# Patient Record
Sex: MALE | Race: WHITE | Employment: FULL TIME | URBAN - METROPOLITAN AREA
[De-identification: names, ages, dates, MRNs, and addresses within clinical notes are randomized per-mention and may not be internally consistent; named-entity substitution may affect disease eponyms.]

---

## 2017-01-04 ENCOUNTER — OP HISTORICAL/CONVERTED ENCOUNTER (OUTPATIENT)
Dept: OTHER | Age: 52
End: 2017-01-04

## 2017-03-13 ENCOUNTER — OP HISTORICAL/CONVERTED ENCOUNTER (OUTPATIENT)
Dept: OTHER | Age: 52
End: 2017-03-13

## 2017-03-28 ENCOUNTER — IP HISTORICAL/CONVERTED ENCOUNTER (OUTPATIENT)
Dept: OTHER | Age: 52
End: 2017-03-28

## 2019-05-15 ENCOUNTER — OP HISTORICAL/CONVERTED ENCOUNTER (OUTPATIENT)
Dept: OTHER | Age: 54
End: 2019-05-15

## 2019-07-10 ENCOUNTER — HOSPITAL ENCOUNTER (OUTPATIENT)
Dept: GENERAL RADIOLOGY | Age: 54
Discharge: HOME | End: 2019-07-10
Payer: OTHER GOVERNMENT

## 2019-07-10 DIAGNOSIS — M25.569 KNEE PAIN: ICD-10-CM

## 2019-07-10 PROCEDURE — 73565 X-RAY EXAM OF KNEES: CPT

## 2019-10-21 ENCOUNTER — OP HISTORICAL/CONVERTED ENCOUNTER (OUTPATIENT)
Dept: OTHER | Age: 54
End: 2019-10-21

## 2019-10-24 ENCOUNTER — IP HISTORICAL/CONVERTED ENCOUNTER (OUTPATIENT)
Dept: OTHER | Age: 54
End: 2019-10-24

## 2020-01-16 ENCOUNTER — OP HISTORICAL/CONVERTED ENCOUNTER (OUTPATIENT)
Dept: OTHER | Age: 55
End: 2020-01-16

## 2020-06-16 ENCOUNTER — OP HISTORICAL/CONVERTED ENCOUNTER (OUTPATIENT)
Dept: OTHER | Age: 55
End: 2020-06-16

## 2020-08-06 ENCOUNTER — OP HISTORICAL/CONVERTED ENCOUNTER (OUTPATIENT)
Dept: OTHER | Age: 55
End: 2020-08-06

## 2020-08-10 ENCOUNTER — IP HISTORICAL/CONVERTED ENCOUNTER (OUTPATIENT)
Dept: OTHER | Age: 55
End: 2020-08-10

## 2020-08-18 LAB — CREATININE, EXTERNAL: 0.92

## 2020-09-08 ENCOUNTER — OFFICE VISIT (OUTPATIENT)
Dept: INFECTIOUS DISEASES | Age: 55
End: 2020-09-08
Payer: OTHER GOVERNMENT

## 2020-09-08 VITALS
DIASTOLIC BLOOD PRESSURE: 73 MMHG | HEART RATE: 87 BPM | SYSTOLIC BLOOD PRESSURE: 105 MMHG | WEIGHT: 197 LBS | HEIGHT: 70 IN | TEMPERATURE: 98.2 F | BODY MASS INDEX: 28.2 KG/M2 | OXYGEN SATURATION: 97 %

## 2020-09-08 DIAGNOSIS — Z22.322 MRSA COLONIZATION: ICD-10-CM

## 2020-09-08 DIAGNOSIS — M17.0 OSTEOARTHRITIS OF BOTH KNEES, UNSPECIFIED OSTEOARTHRITIS TYPE: ICD-10-CM

## 2020-09-08 DIAGNOSIS — T84.54XD INFECTION ASSOCIATED WITH INTERNAL LEFT KNEE PROSTHESIS, SUBSEQUENT ENCOUNTER: Primary | ICD-10-CM

## 2020-09-08 PROCEDURE — 99213 OFFICE O/P EST LOW 20 MIN: CPT | Performed by: INTERNAL MEDICINE

## 2020-09-08 RX ORDER — OXYCODONE HYDROCHLORIDE 5 MG/1
5 TABLET ORAL
Status: ON HOLD | COMMUNITY
Start: 2020-08-11 | End: 2020-11-30

## 2020-09-08 RX ORDER — NAPROXEN 500 MG/1
500 TABLET ORAL
COMMUNITY
Start: 2020-07-20 | End: 2020-12-01

## 2020-09-08 RX ORDER — ASPIRIN 325 MG
TABLET ORAL
Status: ON HOLD | COMMUNITY
Start: 2020-08-11 | End: 2020-11-30

## 2020-09-08 RX ORDER — RIZATRIPTAN BENZOATE 10 MG/1
10 TABLET, ORALLY DISINTEGRATING ORAL
COMMUNITY

## 2020-09-08 NOTE — PROGRESS NOTES
Subjective  [place]        HPI  HPI  Pleasant 75-year-old  male familiar to me from a recent admission to [facility] with left prosthetic knee infection. He is s/p left knee debridement w prosthesis explantation on 8/10 by Dr. Sarah Candelario. Multiple intraoperative Cxs were negative however staph epidermidis was isolated from left knee aspirate Cx done as outpt by orthopedics. He also has a h/o MRSA colonization, s/p colonization with negative repeat screen. Pt was treated with ceftriaxone and vancomycin while hospitalized and discharged on a 6-8  wk course of vancomycin. His ESR was 10 and CRP 43 on 8/11. Labs on 8/25 showed a CRP of 36 and ESR 21. He admits to compliance with vancomycin therapy, denies missed doses or adverse side effects. He mentions his vancomycin dosing has been adjusted recently by the pharmacist due to supratherapeutic trough of > 20. Home health has not been able to draw blood from his left armpit and Cathflo has been ordered to be administered on 09/9. He denies acute complaints on assessment today. ROS  Review of Systems   Constitutional: Negative. Respiratory: Negative. Cardiovascular: Negative. Gastrointestinal: Negative. Genitourinary: Negative. Musculoskeletal: Negative. Skin: Negative. Objective  Physical Exam  Constitutional:       Appearance: Normal appearance. HENT:      Head: Normocephalic and atraumatic. Cardiovascular:      Rate and Rhythm: Normal rate and regular rhythm. Pulmonary:      Effort: Pulmonary effort is normal.      Breath sounds: Normal breath sounds. Abdominal:      General: Abdomen is flat. Palpations: Abdomen is soft. Musculoskeletal: Normal range of motion. Comments: Left knee swelling w brace in place    Skin:     General: Skin is warm. Neurological:      Mental Status: He is alert. Assessment & Plan  Diagnoses and all orders for this visit:    1.  Infection associated with internal left knee prosthesis, subsequent encounter      -Left prosthetic knee infection, s/p debridement with explantation of prosthesis on 8/10      -Intra-op Cxs were neg but staph epi was isolated from left knee aspirate Cx done by ortho as out pt       -On a 6-8 wk course of vancomycin for staph epidermidis/MRSA coverage      -Admits to compliance w antimicrobial therapy, inflammatory markers are trending down       -Will follow recent labs done a wk ago       -Recommend left knee aspirate after 6 wks of IV antibiotics if inflammatory markers normalized if not, will continue Vanc for 2  More wks to complete 8 wks of therapy    2. Osteoarthritis of both knees, unspecified osteoarthritis type      - S/p B/l knee replacement, right knee doing ok, left w infection as above     3.  H/o MRSA colonization: S/p decolonization w neg screen

## 2020-09-14 ENCOUNTER — HOSPITAL ENCOUNTER (OUTPATIENT)
Dept: LAB | Age: 55
Discharge: HOME | End: 2020-09-14
Payer: OTHER GOVERNMENT

## 2020-09-14 LAB
BASOPHILS # BLD: 0.1 K/UL (ref 0–0.1)
BASOPHILS NFR BLD: 1 % (ref 0–1)
BUN SERPL-MCNC: 13 MG/DL (ref 6–20)
CRP SERPL-MCNC: 2.7 MG/DL (ref 0–0.6)
DATE LAST DOSE: ABNORMAL
DIFFERENTIAL METHOD BLD: ABNORMAL
EOSINOPHIL # BLD: 0.2 K/UL (ref 0–0.4)
EOSINOPHIL NFR BLD: 4 % (ref 0–7)
ERYTHROCYTE [DISTWIDTH] IN BLOOD BY AUTOMATED COUNT: 14.6 % (ref 11.5–14.5)
HCT VFR BLD AUTO: 36.1 % (ref 36.6–50.3)
HGB BLD-MCNC: 11 % (ref 12.1–17)
IMM GRANULOCYTES # BLD AUTO: 0 K/UL (ref 0–0.04)
IMM GRANULOCYTES NFR BLD AUTO: 1 % (ref 0–0.5)
LYMPHOCYTES # BLD: 1.4 K/UL (ref 0.8–3.5)
LYMPHOCYTES NFR BLD: 22 % (ref 12–49)
MCH RBC QN AUTO: 25.8 PG (ref 26–34)
MCHC RBC AUTO-ENTMCNC: 30.5 G/DL (ref 30–36.5)
MCV RBC AUTO: 84.5 FL (ref 80–99)
MONOCYTES # BLD: 0.5 K/UL (ref 0–1)
MONOCYTES NFR BLD: 8 % (ref 5–13)
NEUTS SEG # BLD: 4.3 K/UL (ref 1.8–8)
NEUTS SEG NFR BLD: 64 % (ref 32–75)
PLATELET # BLD AUTO: 185 K/UL (ref 150–400)
PMV BLD AUTO: 10.9 FL (ref 8.9–12.9)
RBC # BLD AUTO: 4.27 M/UL (ref 4.1–5.7)
REPORTED DOSE,DOSE: ABNORMAL UNITS
VANCOMYCIN TROUGH SERPL-MCNC: 13.8 UG/ML (ref 5–10)
WBC # BLD AUTO: 6.5 K/UL (ref 4.1–11.1)

## 2020-09-14 PROCEDURE — 85025 COMPLETE CBC W/AUTO DIFF WBC: CPT

## 2020-09-14 PROCEDURE — 80202 ASSAY OF VANCOMYCIN: CPT

## 2020-09-14 PROCEDURE — 84520 ASSAY OF UREA NITROGEN: CPT

## 2020-09-14 PROCEDURE — 86140 C-REACTIVE PROTEIN: CPT

## 2020-09-17 ENCOUNTER — HOSPITAL ENCOUNTER (OUTPATIENT)
Dept: LAB | Age: 55
Discharge: HOME | End: 2020-09-17
Payer: OTHER GOVERNMENT

## 2020-09-17 DIAGNOSIS — T84.54XA INFECTION OF TOTAL LEFT KNEE REPLACEMENT, INITIAL ENCOUNTER (HCC): ICD-10-CM

## 2020-09-17 LAB
BASOPHILS # BLD: 0.1 K/UL (ref 0–0.1)
BASOPHILS NFR BLD: 1 % (ref 0–1)
BUN SERPL-MCNC: 15 MG/DL (ref 6–20)
CREAT SERPL-MCNC: 1.08 MG/DL (ref 0.7–1.3)
CRP SERPL-MCNC: 1.43 MG/DL (ref 0–0.6)
DATE LAST DOSE: ABNORMAL
DIFFERENTIAL METHOD BLD: ABNORMAL
EOSINOPHIL # BLD: 0.2 K/UL (ref 0–0.4)
EOSINOPHIL NFR BLD: 3 % (ref 0–7)
ERYTHROCYTE [DISTWIDTH] IN BLOOD BY AUTOMATED COUNT: 14.3 % (ref 11.5–14.5)
ERYTHROCYTE [SEDIMENTATION RATE] IN BLOOD: 7 MM/HR
HCT VFR BLD AUTO: 38.2 % (ref 36.6–50.3)
HGB BLD-MCNC: 11.5 % (ref 12.1–17)
IMM GRANULOCYTES # BLD AUTO: 0 K/UL (ref 0–0.04)
IMM GRANULOCYTES NFR BLD AUTO: 0 % (ref 0–0.5)
LYMPHOCYTES # BLD: 1.6 K/UL (ref 0.8–3.5)
LYMPHOCYTES NFR BLD: 29 % (ref 12–49)
MCH RBC QN AUTO: 25.2 PG (ref 26–34)
MCHC RBC AUTO-ENTMCNC: 30.1 G/DL (ref 30–36.5)
MCV RBC AUTO: 83.8 FL (ref 80–99)
MONOCYTES # BLD: 0.4 K/UL (ref 0–1)
MONOCYTES NFR BLD: 8 % (ref 5–13)
NEUTS SEG # BLD: 3.1 K/UL (ref 1.8–8)
NEUTS SEG NFR BLD: 59 % (ref 32–75)
PLATELET # BLD AUTO: 188 K/UL (ref 150–400)
PMV BLD AUTO: 11 FL (ref 8.9–12.9)
RBC # BLD AUTO: 4.56 M/UL (ref 4.1–5.7)
REPORTED DOSE,DOSE: ABNORMAL UNITS
VANCOMYCIN TROUGH SERPL-MCNC: 19.1 UG/ML (ref 5–10)
WBC # BLD AUTO: 5.3 K/UL (ref 4.1–11.1)

## 2020-09-17 PROCEDURE — 85652 RBC SED RATE AUTOMATED: CPT

## 2020-09-17 PROCEDURE — 84520 ASSAY OF UREA NITROGEN: CPT

## 2020-09-17 PROCEDURE — 80202 ASSAY OF VANCOMYCIN: CPT

## 2020-09-17 PROCEDURE — 82565 ASSAY OF CREATININE: CPT

## 2020-09-17 PROCEDURE — 36415 COLL VENOUS BLD VENIPUNCTURE: CPT

## 2020-09-17 PROCEDURE — 85025 COMPLETE CBC W/AUTO DIFF WBC: CPT

## 2020-09-17 PROCEDURE — 86140 C-REACTIVE PROTEIN: CPT

## 2020-09-21 ENCOUNTER — HOSPITAL ENCOUNTER (OUTPATIENT)
Dept: LAB | Age: 55
Discharge: HOME | End: 2020-09-21
Payer: OTHER GOVERNMENT

## 2020-09-21 ENCOUNTER — TELEPHONE (OUTPATIENT)
Dept: INFECTIOUS DISEASES | Age: 55
End: 2020-09-21

## 2020-09-21 DIAGNOSIS — T84.54XA INFECTION OF TOTAL LEFT KNEE REPLACEMENT, INITIAL ENCOUNTER (HCC): ICD-10-CM

## 2020-09-21 LAB
BASOPHILS # BLD: 0 K/UL (ref 0–0.1)
BASOPHILS NFR BLD: 1 % (ref 0–1)
BUN SERPL-MCNC: 14 MG/DL (ref 6–20)
CREAT SERPL-MCNC: 1.18 MG/DL (ref 0.7–1.3)
CRP SERPL-MCNC: 3.37 MG/DL (ref 0–0.6)
DATE LAST DOSE: ABNORMAL
DIFFERENTIAL METHOD BLD: ABNORMAL
EOSINOPHIL # BLD: 0.2 K/UL (ref 0–0.4)
EOSINOPHIL NFR BLD: 3 % (ref 0–7)
ERYTHROCYTE [DISTWIDTH] IN BLOOD BY AUTOMATED COUNT: 14.6 % (ref 11.5–14.5)
ERYTHROCYTE [SEDIMENTATION RATE] IN BLOOD: 9 MM/HR
HCT VFR BLD AUTO: 39.8 % (ref 36.6–50.3)
HGB BLD-MCNC: 12.1 % (ref 12.1–17)
IMM GRANULOCYTES # BLD AUTO: 0 K/UL (ref 0–0.04)
IMM GRANULOCYTES NFR BLD AUTO: 0 % (ref 0–0.5)
LYMPHOCYTES # BLD: 1.5 K/UL (ref 0.8–3.5)
LYMPHOCYTES NFR BLD: 22 % (ref 12–49)
MCH RBC QN AUTO: 25.8 PG (ref 26–34)
MCHC RBC AUTO-ENTMCNC: 30.4 G/DL (ref 30–36.5)
MCV RBC AUTO: 84.9 FL (ref 80–99)
MONOCYTES # BLD: 0.6 K/UL (ref 0–1)
MONOCYTES NFR BLD: 9 % (ref 5–13)
NEUTS SEG # BLD: 4.4 K/UL (ref 1.8–8)
NEUTS SEG NFR BLD: 65 % (ref 32–75)
PLATELET # BLD AUTO: 220 K/UL (ref 150–400)
PMV BLD AUTO: 11.2 FL (ref 8.9–12.9)
RBC # BLD AUTO: 4.69 M/UL (ref 4.1–5.7)
REPORTED DOSE,DOSE: ABNORMAL UNITS
VANCOMYCIN TROUGH SERPL-MCNC: 15.1 UG/ML (ref 5–10)
WBC # BLD AUTO: 6.7 K/UL (ref 4.1–11.1)

## 2020-09-21 PROCEDURE — 85025 COMPLETE CBC W/AUTO DIFF WBC: CPT

## 2020-09-21 PROCEDURE — 36415 COLL VENOUS BLD VENIPUNCTURE: CPT

## 2020-09-21 PROCEDURE — 85652 RBC SED RATE AUTOMATED: CPT

## 2020-09-21 PROCEDURE — 80202 ASSAY OF VANCOMYCIN: CPT

## 2020-09-21 PROCEDURE — 86140 C-REACTIVE PROTEIN: CPT

## 2020-09-21 PROCEDURE — 82565 ASSAY OF CREATININE: CPT

## 2020-09-21 PROCEDURE — 84520 ASSAY OF UREA NITROGEN: CPT

## 2020-11-16 ENCOUNTER — HOSPITAL ENCOUNTER (OUTPATIENT)
Dept: GENERAL RADIOLOGY | Age: 55
Discharge: HOME | End: 2020-11-16
Attending: ORTHOPAEDIC SURGERY
Payer: OTHER GOVERNMENT

## 2020-11-16 ENCOUNTER — HOSPITAL ENCOUNTER (OUTPATIENT)
Dept: PREADMISSION TESTING | Age: 55
Discharge: HOME | End: 2020-11-16
Payer: OTHER GOVERNMENT

## 2020-11-16 VITALS
HEIGHT: 70 IN | SYSTOLIC BLOOD PRESSURE: 118 MMHG | DIASTOLIC BLOOD PRESSURE: 80 MMHG | RESPIRATION RATE: 16 BRPM | BODY MASS INDEX: 30.46 KG/M2 | TEMPERATURE: 97.6 F | WEIGHT: 212.74 LBS | OXYGEN SATURATION: 95 %

## 2020-11-16 LAB
ANION GAP SERPL CALC-SCNC: 6 MMOL/L (ref 5–15)
APPEARANCE UR: CLEAR
APTT PPP: 30.6 SEC (ref 23–35.7)
BACTERIA URNS QL MICRO: NEGATIVE /HPF
BILIRUB UR QL: NEGATIVE
BUN SERPL-MCNC: 15 MG/DL (ref 6–20)
BUN/CREAT SERPL: 14 (ref 12–20)
CA-I BLD-MCNC: 9 MG/DL (ref 8.5–10.1)
CHLORIDE SERPL-SCNC: 107 MMOL/L (ref 97–108)
CO2 SERPL-SCNC: 29 MMOL/L (ref 21–32)
COLOR UR: NORMAL
CREAT SERPL-MCNC: 1.11 MG/DL (ref 0.7–1.3)
ERYTHROCYTE [DISTWIDTH] IN BLOOD BY AUTOMATED COUNT: 15 % (ref 11.5–14.5)
GLUCOSE SERPL-MCNC: 102 MG/DL (ref 65–100)
GLUCOSE UR STRIP.AUTO-MCNC: NEGATIVE MG/DL
HCT VFR BLD AUTO: 42.4 % (ref 36.6–50.3)
HGB BLD-MCNC: 13 G/DL (ref 12.1–17)
HGB UR QL STRIP: NEGATIVE
INR PPP: 1 (ref 0.9–1.1)
KETONES UR QL STRIP.AUTO: NEGATIVE MG/DL
LEUKOCYTE ESTERASE UR QL STRIP.AUTO: NEGATIVE
MCH RBC QN AUTO: 25.1 PG (ref 26–34)
MCHC RBC AUTO-ENTMCNC: 30.7 G/DL (ref 30–36.5)
MCV RBC AUTO: 81.9 FL (ref 80–99)
MRSA DNA SPEC QL NAA+PROBE: NOT DETECTED
MUCOUS THREADS URNS QL MICRO: NORMAL /LPF
NITRITE UR QL STRIP.AUTO: NEGATIVE
PH UR STRIP: 5 [PH] (ref 5–8)
PLATELET # BLD AUTO: 175 K/UL (ref 150–400)
PMV BLD AUTO: 11.5 FL (ref 8.9–12.9)
POTASSIUM SERPL-SCNC: 3.9 MMOL/L (ref 3.5–5.1)
PROT UR STRIP-MCNC: NEGATIVE MG/DL
PROTHROMBIN TIME: 13.1 SEC (ref 11.9–14.7)
RBC # BLD AUTO: 5.18 M/UL (ref 4.1–5.7)
RBC #/AREA URNS HPF: NORMAL /HPF (ref 0–5)
SODIUM SERPL-SCNC: 142 MMOL/L (ref 136–145)
SP GR UR REFRACTOMETRY: 1.01 (ref 1–1.03)
THERAPEUTIC RANGE,PTTT: NORMAL SEC (ref 68–109)
UROBILINOGEN UR QL STRIP.AUTO: 0.1 EU/DL (ref 0.1–1)
WBC # BLD AUTO: 5.7 K/UL (ref 4.1–11.1)
WBC URNS QL MICRO: NORMAL /HPF (ref 0–4)

## 2020-11-16 PROCEDURE — 85027 COMPLETE CBC AUTOMATED: CPT

## 2020-11-16 PROCEDURE — 71046 X-RAY EXAM CHEST 2 VIEWS: CPT

## 2020-11-16 PROCEDURE — 93005 ELECTROCARDIOGRAM TRACING: CPT

## 2020-11-16 PROCEDURE — 36415 COLL VENOUS BLD VENIPUNCTURE: CPT

## 2020-11-16 PROCEDURE — 81001 URINALYSIS AUTO W/SCOPE: CPT

## 2020-11-16 PROCEDURE — 85610 PROTHROMBIN TIME: CPT

## 2020-11-16 PROCEDURE — 86900 BLOOD TYPING SEROLOGIC ABO: CPT

## 2020-11-16 PROCEDURE — 85730 THROMBOPLASTIN TIME PARTIAL: CPT

## 2020-11-16 PROCEDURE — 80048 BASIC METABOLIC PNL TOTAL CA: CPT

## 2020-11-16 PROCEDURE — 87086 URINE CULTURE/COLONY COUNT: CPT

## 2020-11-16 PROCEDURE — 73562 X-RAY EXAM OF KNEE 3: CPT

## 2020-11-16 PROCEDURE — 87641 MR-STAPH DNA AMP PROBE: CPT

## 2020-11-16 NOTE — PERIOP NOTES
80 Dr. Liz Ingram office called, left a message for Reyes Castañeda, surgical scheduler re: pt takes naproxen 500 mg as needed for pain, requested to make Dr. Laith Travis aware and call patient with any instructions.

## 2020-11-17 LAB
ABO + RH BLD: NORMAL
ATRIAL RATE: 64 BPM
BLOOD GROUP ANTIBODIES SERPL: NEGATIVE
CALCULATED P AXIS, ECG09: 6 DEGREES
CALCULATED R AXIS, ECG10: -12 DEGREES
CALCULATED T AXIS, ECG11: 19 DEGREES
DIAGNOSIS, 93000: NORMAL
P-R INTERVAL, ECG05: 176 MS
Q-T INTERVAL, ECG07: 414 MS
QRS DURATION, ECG06: 82 MS
QTC CALCULATION (BEZET), ECG08: 427 MS
SPECIMEN EXP DATE BLD: NORMAL
VENTRICULAR RATE, ECG03: 64 BPM

## 2020-11-18 LAB
BACTERIA SPEC CULT: NORMAL
SPECIAL REQUESTS,SREQ: NORMAL

## 2020-11-18 NOTE — PROGRESS NOTES
Patient refused joint class at this time. Patient stated that he has had a joint class in the past and has been through multiple surgeries and knows what to expect. Patient received a joint book and was informed if he had any further questions prior to surgery he call the number on the front of the book. Patient verbalizes understanding.

## 2020-11-25 ENCOUNTER — HOSPITAL ENCOUNTER (OUTPATIENT)
Dept: PREADMISSION TESTING | Age: 55
Discharge: HOME | End: 2020-11-25
Payer: OTHER GOVERNMENT

## 2020-11-25 LAB — SARS-COV-2, COV2: NORMAL

## 2020-11-25 PROCEDURE — 87635 SARS-COV-2 COVID-19 AMP PRB: CPT

## 2020-11-27 ENCOUNTER — ANESTHESIA EVENT (OUTPATIENT)
Dept: SURGERY | Age: 55
DRG: 467 | End: 2020-11-27
Payer: OTHER GOVERNMENT

## 2020-11-27 LAB — SARS-COV-2, COV2NT: NOT DETECTED

## 2020-11-30 ENCOUNTER — HOSPITAL ENCOUNTER (INPATIENT)
Age: 55
LOS: 1 days | Discharge: HOME HEALTH | DRG: 467 | End: 2020-12-01
Attending: ORTHOPAEDIC SURGERY | Admitting: ORTHOPAEDIC SURGERY
Payer: OTHER GOVERNMENT

## 2020-11-30 ENCOUNTER — ANESTHESIA (OUTPATIENT)
Dept: SURGERY | Age: 55
DRG: 467 | End: 2020-11-30
Payer: OTHER GOVERNMENT

## 2020-11-30 ENCOUNTER — APPOINTMENT (OUTPATIENT)
Dept: GENERAL RADIOLOGY | Age: 55
DRG: 467 | End: 2020-11-30
Attending: ORTHOPAEDIC SURGERY
Payer: OTHER GOVERNMENT

## 2020-11-30 DIAGNOSIS — Z89.529 ACQUIRED ABSENCE OF KNEE JOINT FOLLOWING EXPLANTATION OF JOINT PROSTHESIS WITH PRESENCE OF ANTIBIOTIC-IMPREGNATED CEMENT SPACER: Primary | ICD-10-CM

## 2020-11-30 DIAGNOSIS — T84.54XD INFECTION AND INFLAMMATORY REACTION DUE TO INTERNAL LEFT KNEE PROSTHESIS, SUBSEQUENT ENCOUNTER: ICD-10-CM

## 2020-11-30 DIAGNOSIS — M25.562 LEFT KNEE PAIN, UNSPECIFIED CHRONICITY: ICD-10-CM

## 2020-11-30 PROCEDURE — 0SPD0EZ REMOVAL OF ARTICULATING SPACER FROM LEFT KNEE JOINT, OPEN APPROACH: ICD-10-PCS | Performed by: ORTHOPAEDIC SURGERY

## 2020-11-30 PROCEDURE — 77030013708 HC HNDPC SUC IRR PULS STRY –B: Performed by: ORTHOPAEDIC SURGERY

## 2020-11-30 PROCEDURE — 65270000029 HC RM PRIVATE

## 2020-11-30 PROCEDURE — 77030013189 HC SLV COMPR SCD HUNT -B: Performed by: ORTHOPAEDIC SURGERY

## 2020-11-30 PROCEDURE — 77030013079 HC BLNKT BAIR HGGR 3M -A: Performed by: ANESTHESIOLOGY

## 2020-11-30 PROCEDURE — 77030008684 HC TU ET CUF COVD -B: Performed by: ANESTHESIOLOGY

## 2020-11-30 PROCEDURE — 87205 SMEAR GRAM STAIN: CPT

## 2020-11-30 PROCEDURE — 2709999900 HC NON-CHARGEABLE SUPPLY: Performed by: ORTHOPAEDIC SURGERY

## 2020-11-30 PROCEDURE — 77030002916 HC SUT ETHLN J&J -A: Performed by: ORTHOPAEDIC SURGERY

## 2020-11-30 PROCEDURE — 36415 COLL VENOUS BLD VENIPUNCTURE: CPT

## 2020-11-30 PROCEDURE — C1713 ANCHOR/SCREW BN/BN,TIS/BN: HCPCS | Performed by: ORTHOPAEDIC SURGERY

## 2020-11-30 PROCEDURE — 74011250636 HC RX REV CODE- 250/636: Performed by: NURSE ANESTHETIST, CERTIFIED REGISTERED

## 2020-11-30 PROCEDURE — 76010000178 HC OR TIME 5.5 TO 6 HR INTENSV-TIER 1: Performed by: ORTHOPAEDIC SURGERY

## 2020-11-30 PROCEDURE — C1776 JOINT DEVICE (IMPLANTABLE): HCPCS | Performed by: ORTHOPAEDIC SURGERY

## 2020-11-30 PROCEDURE — 77030010785: Performed by: ORTHOPAEDIC SURGERY

## 2020-11-30 PROCEDURE — 74011000250 HC RX REV CODE- 250: Performed by: NURSE ANESTHETIST, CERTIFIED REGISTERED

## 2020-11-30 PROCEDURE — 74011000258 HC RX REV CODE- 258: Performed by: NURSE ANESTHETIST, CERTIFIED REGISTERED

## 2020-11-30 PROCEDURE — 87176 TISSUE HOMOGENIZATION CULTR: CPT

## 2020-11-30 PROCEDURE — 77030020061 HC IV BLD WRMR ADMIN SET 3M -B: Performed by: ANESTHESIOLOGY

## 2020-11-30 PROCEDURE — 77030000032 HC CUF TRNQT ZIMM -B: Performed by: ORTHOPAEDIC SURGERY

## 2020-11-30 PROCEDURE — 88305 TISSUE EXAM BY PATHOLOGIST: CPT

## 2020-11-30 PROCEDURE — 77030014368: Performed by: ORTHOPAEDIC SURGERY

## 2020-11-30 PROCEDURE — 74011250636 HC RX REV CODE- 250/636: Performed by: ANESTHESIOLOGY

## 2020-11-30 PROCEDURE — 77030018673: Performed by: ORTHOPAEDIC SURGERY

## 2020-11-30 PROCEDURE — 73560 X-RAY EXAM OF KNEE 1 OR 2: CPT

## 2020-11-30 PROCEDURE — 88311 DECALCIFY TISSUE: CPT

## 2020-11-30 PROCEDURE — 77030031139 HC SUT VCRL2 J&J -A: Performed by: ORTHOPAEDIC SURGERY

## 2020-11-30 PROCEDURE — 74011000250 HC RX REV CODE- 250: Performed by: ORTHOPAEDIC SURGERY

## 2020-11-30 PROCEDURE — 74011000258 HC RX REV CODE- 258: Performed by: ORTHOPAEDIC SURGERY

## 2020-11-30 PROCEDURE — 74011250636 HC RX REV CODE- 250/636: Performed by: ORTHOPAEDIC SURGERY

## 2020-11-30 PROCEDURE — 88307 TISSUE EXAM BY PATHOLOGIST: CPT

## 2020-11-30 PROCEDURE — 77030002982 HC SUT POLYSRB J&J -A: Performed by: ORTHOPAEDIC SURGERY

## 2020-11-30 PROCEDURE — 0SRD0J9 REPLACEMENT OF LEFT KNEE JOINT WITH SYNTHETIC SUBSTITUTE, CEMENTED, OPEN APPROACH: ICD-10-PCS | Performed by: ORTHOPAEDIC SURGERY

## 2020-11-30 PROCEDURE — 88331 PATH CONSLTJ SURG 1 BLK 1SPC: CPT

## 2020-11-30 PROCEDURE — 99221 1ST HOSP IP/OBS SF/LOW 40: CPT | Performed by: INTERNAL MEDICINE

## 2020-11-30 PROCEDURE — 77030020274 HC MISC IMPL ORTHOPEDIC: Performed by: ORTHOPAEDIC SURGERY

## 2020-11-30 PROCEDURE — 76060000042 HC ANESTHESIA 5.5 TO 6 HR: Performed by: ORTHOPAEDIC SURGERY

## 2020-11-30 PROCEDURE — 74011250637 HC RX REV CODE- 250/637: Performed by: ORTHOPAEDIC SURGERY

## 2020-11-30 PROCEDURE — 77030026438 HC STYL ET INTUB CARD -A: Performed by: ANESTHESIOLOGY

## 2020-11-30 PROCEDURE — 76210000016 HC OR PH I REC 1 TO 1.5 HR: Performed by: ORTHOPAEDIC SURGERY

## 2020-11-30 PROCEDURE — 76000 FLUOROSCOPY <1 HR PHYS/QHP: CPT

## 2020-11-30 DEVICE — IMPLANTABLE DEVICE: Type: IMPLANTABLE DEVICE | Site: KNEE | Status: FUNCTIONAL

## 2020-11-30 DEVICE — COMPONENT PAT DIA41MM POLYETH DOME CEM MEDIALIZED ATTUNE: Type: IMPLANTABLE DEVICE | Site: KNEE | Status: FUNCTIONAL

## 2020-11-30 DEVICE — STEM FEM L110MM DIA16MM KNEE TI ALLY CRSS SLT PRESSFIT REV: Type: IMPLANTABLE DEVICE | Site: KNEE | Status: FUNCTIONAL

## 2020-11-30 DEVICE — BASEPLATE TIB SZ 7 ROT PLATFRM CO CHROM MOLYBDENUM TI ALLY: Type: IMPLANTABLE DEVICE | Site: KNEE | Status: FUNCTIONAL

## 2020-11-30 DEVICE — STIMULAN® RAPID CURE PROVIDED STERILE FOR SINGLE PATIENT USE. STIMULAN® RAPID CURE CONTAINS CALCIUM SULFATE POWDER AND MIXING SOLUTION IN PRE-MEASURED QUANTITIES SO THAT WHEN MIXED TOGETHER IN A STERILE MIXING BOWL, THE RESULTANT PASTE IS TO BE DIGITALLY PACKED INTO OPEN BONE VOID/GAP TO SET INSITU OR PLACED INTO THE MOULD PROVIDED, THE MIXTURE SETS TO FORM BEADS. THE BIODEGRADABLE, RADIOPAQUE BEADS ARE RESORBED IN APPROXIMATELY 30 – 60 DAYS WHEN USED IN ACCORDANCE WITH THE DEVICE LABELLING. STIMULAN® RAPID CURE IS MANUFACTURED FROM SYNTHETIC IMPLANT GRADE CALCIUM SULFATE DIHYDRATE(CASO4.2H2O) THAT RESORBS AND IS REPLACED WITH BONE DURING THE HEALING PROCESS. ALSO, AS THE BONE VOID FILLER BEADS ARE BIODEGRADABLE AND BIOCOMPATIBLE, THEY MAY BE USED AT AN INFECTED SITE.
Type: IMPLANTABLE DEVICE | Site: KNEE | Status: FUNCTIONAL
Brand: STIMULAN® RAPID CURE

## 2020-11-30 DEVICE — STEM FEM L110MM DIA14MM REV PRESSFIT ATTUNE: Type: IMPLANTABLE DEVICE | Site: KNEE | Status: FUNCTIONAL

## 2020-11-30 DEVICE — COMPONENT FEM SZ 8 L KNEE CO CHROM MOLYBDENUM CEM W/ ASYM: Type: IMPLANTABLE DEVICE | Site: KNEE | Status: FUNCTIONAL

## 2020-11-30 DEVICE — CEMENT BNE 20ML 40GM FULL DOSE PMMA W/O ANTIBIO M VISC: Type: IMPLANTABLE DEVICE | Site: KNEE | Status: FUNCTIONAL

## 2020-11-30 RX ORDER — OXYCODONE HYDROCHLORIDE 5 MG/1
5 TABLET ORAL
Status: CANCELLED | OUTPATIENT
Start: 2020-11-30

## 2020-11-30 RX ORDER — FAMOTIDINE 10 MG/ML
INJECTION INTRAVENOUS AS NEEDED
Status: DISCONTINUED | OUTPATIENT
Start: 2020-11-30 | End: 2020-11-30 | Stop reason: HOSPADM

## 2020-11-30 RX ORDER — MIDAZOLAM HYDROCHLORIDE 1 MG/ML
1 INJECTION, SOLUTION INTRAMUSCULAR; INTRAVENOUS AS NEEDED
Status: DISCONTINUED | OUTPATIENT
Start: 2020-11-30 | End: 2020-11-30 | Stop reason: HOSPADM

## 2020-11-30 RX ORDER — DIPHENHYDRAMINE HYDROCHLORIDE 50 MG/ML
12.5 INJECTION, SOLUTION INTRAMUSCULAR; INTRAVENOUS AS NEEDED
Status: DISCONTINUED | OUTPATIENT
Start: 2020-11-30 | End: 2020-11-30 | Stop reason: HOSPADM

## 2020-11-30 RX ORDER — BUPIVACAINE HYDROCHLORIDE 5 MG/ML
INJECTION, SOLUTION EPIDURAL; INTRACAUDAL
Status: DISCONTINUED
Start: 2020-11-30 | End: 2020-11-30

## 2020-11-30 RX ORDER — POLYETHYLENE GLYCOL 3350 17 G/17G
17 POWDER, FOR SOLUTION ORAL DAILY
Status: DISCONTINUED | OUTPATIENT
Start: 2020-12-01 | End: 2020-12-01 | Stop reason: HOSPADM

## 2020-11-30 RX ORDER — SODIUM CHLORIDE 0.9 % (FLUSH) 0.9 %
5-40 SYRINGE (ML) INJECTION AS NEEDED
Status: CANCELLED | OUTPATIENT
Start: 2020-11-30

## 2020-11-30 RX ORDER — OXYCODONE HYDROCHLORIDE 10 MG/1
10 TABLET ORAL
Status: CANCELLED | OUTPATIENT
Start: 2020-11-30

## 2020-11-30 RX ORDER — SODIUM CHLORIDE 0.9 % (FLUSH) 0.9 %
5-40 SYRINGE (ML) INJECTION EVERY 8 HOURS
Status: DISCONTINUED | OUTPATIENT
Start: 2020-11-30 | End: 2020-11-30 | Stop reason: HOSPADM

## 2020-11-30 RX ORDER — LIDOCAINE HYDROCHLORIDE 10 MG/ML
0.1 INJECTION, SOLUTION EPIDURAL; INFILTRATION; INTRACAUDAL; PERINEURAL AS NEEDED
Status: DISCONTINUED | OUTPATIENT
Start: 2020-11-30 | End: 2020-11-30 | Stop reason: HOSPADM

## 2020-11-30 RX ORDER — CELECOXIB 200 MG/1
400 CAPSULE ORAL ONCE
Status: COMPLETED | OUTPATIENT
Start: 2020-11-30 | End: 2020-11-30

## 2020-11-30 RX ORDER — GABAPENTIN 300 MG/1
300 CAPSULE ORAL ONCE
Status: COMPLETED | OUTPATIENT
Start: 2020-11-30 | End: 2020-11-30

## 2020-11-30 RX ORDER — DEXAMETHASONE SODIUM PHOSPHATE 10 MG/ML
INJECTION INTRAMUSCULAR; INTRAVENOUS
Status: DISCONTINUED
Start: 2020-11-30 | End: 2020-11-30

## 2020-11-30 RX ORDER — SODIUM CHLORIDE 0.9 % (FLUSH) 0.9 %
5-40 SYRINGE (ML) INJECTION AS NEEDED
Status: DISCONTINUED | OUTPATIENT
Start: 2020-11-30 | End: 2020-12-01 | Stop reason: HOSPADM

## 2020-11-30 RX ORDER — NALBUPHINE HYDROCHLORIDE 10 MG/ML
2 INJECTION, SOLUTION INTRAMUSCULAR; INTRAVENOUS; SUBCUTANEOUS
Status: CANCELLED | OUTPATIENT
Start: 2020-11-30

## 2020-11-30 RX ORDER — NORETHINDRONE AND ETHINYL ESTRADIOL 0.5-0.035
5 KIT ORAL AS NEEDED
Status: DISCONTINUED | OUTPATIENT
Start: 2020-11-30 | End: 2020-11-30 | Stop reason: HOSPADM

## 2020-11-30 RX ORDER — LIDOCAINE HYDROCHLORIDE 20 MG/ML
INJECTION, SOLUTION EPIDURAL; INFILTRATION; INTRACAUDAL; PERINEURAL AS NEEDED
Status: DISCONTINUED | OUTPATIENT
Start: 2020-11-30 | End: 2020-11-30 | Stop reason: HOSPADM

## 2020-11-30 RX ORDER — CEFAZOLIN SODIUM IN 0.9 % NACL 2 G/100 ML
2 PLASTIC BAG, INJECTION (ML) INTRAVENOUS EVERY 8 HOURS
Status: CANCELLED | OUTPATIENT
Start: 2020-11-30 | End: 2020-12-01

## 2020-11-30 RX ORDER — OXYCODONE HCL 10 MG/1
10 TABLET, FILM COATED, EXTENDED RELEASE ORAL ONCE
Status: COMPLETED | OUTPATIENT
Start: 2020-11-30 | End: 2020-11-30

## 2020-11-30 RX ORDER — CEFAZOLIN SODIUM 2 G/100ML
2 INJECTION, SOLUTION INTRAVENOUS ONCE
Status: DISCONTINUED | OUTPATIENT
Start: 2020-11-30 | End: 2020-11-30

## 2020-11-30 RX ORDER — ONDANSETRON 2 MG/ML
INJECTION INTRAMUSCULAR; INTRAVENOUS AS NEEDED
Status: DISCONTINUED | OUTPATIENT
Start: 2020-11-30 | End: 2020-11-30 | Stop reason: HOSPADM

## 2020-11-30 RX ORDER — SODIUM CHLORIDE 0.9 % (FLUSH) 0.9 %
5-40 SYRINGE (ML) INJECTION AS NEEDED
Status: DISCONTINUED | OUTPATIENT
Start: 2020-11-30 | End: 2020-11-30 | Stop reason: HOSPADM

## 2020-11-30 RX ORDER — HYDROCODONE BITARTRATE AND ACETAMINOPHEN 5; 325 MG/1; MG/1
1 TABLET ORAL AS NEEDED
Status: DISCONTINUED | OUTPATIENT
Start: 2020-11-30 | End: 2020-11-30 | Stop reason: HOSPADM

## 2020-11-30 RX ORDER — SODIUM CHLORIDE 0.9 % (FLUSH) 0.9 %
5-40 SYRINGE (ML) INJECTION EVERY 8 HOURS
Status: DISCONTINUED | OUTPATIENT
Start: 2020-11-30 | End: 2020-12-01 | Stop reason: HOSPADM

## 2020-11-30 RX ORDER — DEXAMETHASONE SODIUM PHOSPHATE 4 MG/ML
INJECTION, SOLUTION INTRA-ARTICULAR; INTRALESIONAL; INTRAMUSCULAR; INTRAVENOUS; SOFT TISSUE AS NEEDED
Status: DISCONTINUED | OUTPATIENT
Start: 2020-11-30 | End: 2020-11-30 | Stop reason: HOSPADM

## 2020-11-30 RX ORDER — NALOXONE HYDROCHLORIDE 0.4 MG/ML
0.4 INJECTION, SOLUTION INTRAMUSCULAR; INTRAVENOUS; SUBCUTANEOUS AS NEEDED
Status: CANCELLED | OUTPATIENT
Start: 2020-11-30

## 2020-11-30 RX ORDER — FAMOTIDINE 20 MG/1
20 TABLET, FILM COATED ORAL 2 TIMES DAILY
Status: CANCELLED | OUTPATIENT
Start: 2020-11-30

## 2020-11-30 RX ORDER — SODIUM CHLORIDE 0.9 % (FLUSH) 0.9 %
5-40 SYRINGE (ML) INJECTION EVERY 8 HOURS
Status: CANCELLED | OUTPATIENT
Start: 2020-11-30

## 2020-11-30 RX ORDER — METOPROLOL TARTRATE 5 MG/5ML
2.5 INJECTION INTRAVENOUS
Status: DISCONTINUED | OUTPATIENT
Start: 2020-11-30 | End: 2020-11-30 | Stop reason: HOSPADM

## 2020-11-30 RX ORDER — MIDAZOLAM HYDROCHLORIDE 1 MG/ML
0.5 INJECTION, SOLUTION INTRAMUSCULAR; INTRAVENOUS
Status: DISCONTINUED | OUTPATIENT
Start: 2020-11-30 | End: 2020-11-30 | Stop reason: HOSPADM

## 2020-11-30 RX ORDER — AMOXICILLIN 250 MG
1 CAPSULE ORAL 2 TIMES DAILY
Status: CANCELLED | OUTPATIENT
Start: 2020-11-30

## 2020-11-30 RX ORDER — ACETAMINOPHEN 500 MG
1000 TABLET ORAL EVERY 6 HOURS
Status: CANCELLED | OUTPATIENT
Start: 2020-11-30

## 2020-11-30 RX ORDER — FACIAL-BODY WIPES
10 EACH TOPICAL DAILY PRN
Status: DISCONTINUED | OUTPATIENT
Start: 2020-12-02 | End: 2020-12-01 | Stop reason: HOSPADM

## 2020-11-30 RX ORDER — NALOXONE HYDROCHLORIDE 0.4 MG/ML
0.4 INJECTION, SOLUTION INTRAMUSCULAR; INTRAVENOUS; SUBCUTANEOUS AS NEEDED
Status: DISCONTINUED | OUTPATIENT
Start: 2020-11-30 | End: 2020-12-01 | Stop reason: HOSPADM

## 2020-11-30 RX ORDER — PROPOFOL 10 MG/ML
INJECTION, EMULSION INTRAVENOUS AS NEEDED
Status: DISCONTINUED | OUTPATIENT
Start: 2020-11-30 | End: 2020-11-30 | Stop reason: HOSPADM

## 2020-11-30 RX ORDER — ASPIRIN 325 MG
325 TABLET, DELAYED RELEASE (ENTERIC COATED) ORAL 2 TIMES DAILY
Status: CANCELLED | OUTPATIENT
Start: 2020-11-30

## 2020-11-30 RX ORDER — CEFAZOLIN SODIUM IN 0.9 % NACL 2 G/100 ML
2 PLASTIC BAG, INJECTION (ML) INTRAVENOUS ONCE
Status: DISCONTINUED | OUTPATIENT
Start: 2020-11-30 | End: 2020-11-30 | Stop reason: CLARIF

## 2020-11-30 RX ORDER — HYDROMORPHONE HYDROCHLORIDE 1 MG/ML
0.4 INJECTION, SOLUTION INTRAMUSCULAR; INTRAVENOUS; SUBCUTANEOUS
Status: DISCONTINUED | OUTPATIENT
Start: 2020-11-30 | End: 2020-11-30 | Stop reason: HOSPADM

## 2020-11-30 RX ORDER — FENTANYL CITRATE 50 UG/ML
50 INJECTION, SOLUTION INTRAMUSCULAR; INTRAVENOUS
Status: DISCONTINUED | OUTPATIENT
Start: 2020-11-30 | End: 2020-11-30 | Stop reason: HOSPADM

## 2020-11-30 RX ORDER — SODIUM CHLORIDE, SODIUM LACTATE, POTASSIUM CHLORIDE, CALCIUM CHLORIDE 600; 310; 30; 20 MG/100ML; MG/100ML; MG/100ML; MG/100ML
INJECTION, SOLUTION INTRAVENOUS
Status: DISCONTINUED | OUTPATIENT
Start: 2020-11-30 | End: 2020-11-30 | Stop reason: HOSPADM

## 2020-11-30 RX ORDER — SODIUM CHLORIDE 9 MG/ML
100 INJECTION, SOLUTION INTRAVENOUS CONTINUOUS
Status: CANCELLED | OUTPATIENT
Start: 2020-11-30 | End: 2020-12-01

## 2020-11-30 RX ORDER — MORPHINE SULFATE 10 MG/ML
2 INJECTION, SOLUTION INTRAMUSCULAR; INTRAVENOUS
Status: DISCONTINUED | OUTPATIENT
Start: 2020-11-30 | End: 2020-11-30 | Stop reason: HOSPADM

## 2020-11-30 RX ORDER — KETOROLAC TROMETHAMINE 30 MG/ML
30 INJECTION, SOLUTION INTRAMUSCULAR; INTRAVENOUS EVERY 6 HOURS
Status: DISCONTINUED | OUTPATIENT
Start: 2020-11-30 | End: 2020-12-01

## 2020-11-30 RX ORDER — SODIUM CHLORIDE 9 MG/ML
125 INJECTION, SOLUTION INTRAVENOUS CONTINUOUS
Status: DISPENSED | OUTPATIENT
Start: 2020-11-30 | End: 2020-12-01

## 2020-11-30 RX ORDER — TRANEXAMIC ACID 100 MG/ML
1 INJECTION, SOLUTION INTRAVENOUS ONCE
Status: DISCONTINUED | OUTPATIENT
Start: 2020-11-30 | End: 2020-11-30 | Stop reason: CLARIF

## 2020-11-30 RX ORDER — ONDANSETRON 2 MG/ML
4 INJECTION INTRAMUSCULAR; INTRAVENOUS AS NEEDED
Status: DISCONTINUED | OUTPATIENT
Start: 2020-11-30 | End: 2020-11-30 | Stop reason: HOSPADM

## 2020-11-30 RX ORDER — CELECOXIB 200 MG/1
200 CAPSULE ORAL 2 TIMES DAILY
Status: CANCELLED | OUTPATIENT
Start: 2020-11-30

## 2020-11-30 RX ORDER — FENTANYL CITRATE 50 UG/ML
INJECTION, SOLUTION INTRAMUSCULAR; INTRAVENOUS
Status: COMPLETED
Start: 2020-11-30 | End: 2020-11-30

## 2020-11-30 RX ORDER — MIDAZOLAM HYDROCHLORIDE 1 MG/ML
INJECTION, SOLUTION INTRAMUSCULAR; INTRAVENOUS AS NEEDED
Status: DISCONTINUED | OUTPATIENT
Start: 2020-11-30 | End: 2020-11-30 | Stop reason: HOSPADM

## 2020-11-30 RX ORDER — FENTANYL CITRATE 50 UG/ML
INJECTION, SOLUTION INTRAMUSCULAR; INTRAVENOUS
Status: COMPLETED | OUTPATIENT
Start: 2020-11-30 | End: 2020-11-30

## 2020-11-30 RX ORDER — MORPHINE SULFATE 2 MG/ML
2 INJECTION, SOLUTION INTRAMUSCULAR; INTRAVENOUS
Status: CANCELLED | OUTPATIENT
Start: 2020-11-30 | End: 2020-12-01

## 2020-11-30 RX ORDER — FENTANYL CITRATE 50 UG/ML
50 INJECTION, SOLUTION INTRAMUSCULAR; INTRAVENOUS AS NEEDED
Status: DISCONTINUED | OUTPATIENT
Start: 2020-11-30 | End: 2020-11-30 | Stop reason: HOSPADM

## 2020-11-30 RX ORDER — OXYCODONE HYDROCHLORIDE 5 MG/1
5 TABLET ORAL
Status: DISCONTINUED | OUTPATIENT
Start: 2020-11-30 | End: 2020-12-01 | Stop reason: HOSPADM

## 2020-11-30 RX ORDER — ONDANSETRON 2 MG/ML
4 INJECTION INTRAMUSCULAR; INTRAVENOUS
Status: CANCELLED | OUTPATIENT
Start: 2020-11-30 | End: 2020-12-01

## 2020-11-30 RX ORDER — AMOXICILLIN 250 MG
1 CAPSULE ORAL 2 TIMES DAILY
Status: DISCONTINUED | OUTPATIENT
Start: 2020-11-30 | End: 2020-12-01 | Stop reason: HOSPADM

## 2020-11-30 RX ORDER — CEFAZOLIN SODIUM 2 G/100ML
2 INJECTION, SOLUTION INTRAVENOUS EVERY 8 HOURS
Status: COMPLETED | OUTPATIENT
Start: 2020-11-30 | End: 2020-12-01

## 2020-11-30 RX ORDER — ONDANSETRON 2 MG/ML
4 INJECTION INTRAMUSCULAR; INTRAVENOUS
Status: ACTIVE | OUTPATIENT
Start: 2020-11-30 | End: 2020-12-01

## 2020-11-30 RX ORDER — ENOXAPARIN SODIUM 100 MG/ML
40 INJECTION SUBCUTANEOUS DAILY
Status: DISCONTINUED | OUTPATIENT
Start: 2020-12-01 | End: 2020-12-01 | Stop reason: HOSPADM

## 2020-11-30 RX ORDER — LABETALOL HCL 20 MG/4 ML
5 SYRINGE (ML) INTRAVENOUS
Status: DISCONTINUED | OUTPATIENT
Start: 2020-11-30 | End: 2020-11-30 | Stop reason: HOSPADM

## 2020-11-30 RX ORDER — CEFAZOLIN SODIUM IN 0.9 % NACL 2 G/100 ML
2 PLASTIC BAG, INJECTION (ML) INTRAVENOUS EVERY 8 HOURS
Status: DISCONTINUED | OUTPATIENT
Start: 2020-11-30 | End: 2020-11-30

## 2020-11-30 RX ORDER — SUCCINYLCHOLINE CHLORIDE 20 MG/ML
INJECTION INTRAMUSCULAR; INTRAVENOUS AS NEEDED
Status: DISCONTINUED | OUTPATIENT
Start: 2020-11-30 | End: 2020-11-30 | Stop reason: HOSPADM

## 2020-11-30 RX ORDER — ACETAMINOPHEN 325 MG/1
650 TABLET ORAL ONCE
Status: COMPLETED | OUTPATIENT
Start: 2020-11-30 | End: 2020-11-30

## 2020-11-30 RX ORDER — HYDRALAZINE HYDROCHLORIDE 20 MG/ML
10 INJECTION INTRAMUSCULAR; INTRAVENOUS
Status: DISCONTINUED | OUTPATIENT
Start: 2020-11-30 | End: 2020-11-30 | Stop reason: HOSPADM

## 2020-11-30 RX ORDER — TRAMADOL HYDROCHLORIDE 50 MG/1
50 TABLET ORAL
Status: DISCONTINUED | OUTPATIENT
Start: 2020-11-30 | End: 2020-12-01 | Stop reason: HOSPADM

## 2020-11-30 RX ORDER — CEFAZOLIN SODIUM 1 G/3ML
INJECTION, POWDER, FOR SOLUTION INTRAMUSCULAR; INTRAVENOUS AS NEEDED
Status: DISCONTINUED | OUTPATIENT
Start: 2020-11-30 | End: 2020-11-30 | Stop reason: HOSPADM

## 2020-11-30 RX ORDER — ACETAMINOPHEN 500 MG
1000 TABLET ORAL EVERY 6 HOURS
Status: DISCONTINUED | OUTPATIENT
Start: 2020-11-30 | End: 2020-12-01 | Stop reason: HOSPADM

## 2020-11-30 RX ORDER — SODIUM CHLORIDE, SODIUM LACTATE, POTASSIUM CHLORIDE, CALCIUM CHLORIDE 600; 310; 30; 20 MG/100ML; MG/100ML; MG/100ML; MG/100ML
20 INJECTION, SOLUTION INTRAVENOUS CONTINUOUS
Status: DISCONTINUED | OUTPATIENT
Start: 2020-11-30 | End: 2020-11-30 | Stop reason: HOSPADM

## 2020-11-30 RX ADMIN — FENTANYL CITRATE 50 MCG: 50 INJECTION, SOLUTION INTRAMUSCULAR; INTRAVENOUS at 08:55

## 2020-11-30 RX ADMIN — SODIUM CHLORIDE, POTASSIUM CHLORIDE, SODIUM LACTATE AND CALCIUM CHLORIDE: 600; 310; 30; 20 INJECTION, SOLUTION INTRAVENOUS at 07:24

## 2020-11-30 RX ADMIN — ONDANSETRON 4 MG: 2 INJECTION INTRAMUSCULAR; INTRAVENOUS at 11:42

## 2020-11-30 RX ADMIN — FENTANYL CITRATE 50 MCG: 50 INJECTION, SOLUTION INTRAMUSCULAR; INTRAVENOUS at 07:42

## 2020-11-30 RX ADMIN — ACETAMINOPHEN 650 MG: 325 TABLET, FILM COATED ORAL at 06:37

## 2020-11-30 RX ADMIN — PROPOFOL 200 MG: 10 INJECTION, EMULSION INTRAVENOUS at 07:42

## 2020-11-30 RX ADMIN — FAMOTIDINE 20 MG: 10 INJECTION INTRAVENOUS at 10:19

## 2020-11-30 RX ADMIN — OXYCODONE HYDROCHLORIDE 10 MG: 10 TABLET, FILM COATED, EXTENDED RELEASE ORAL at 06:37

## 2020-11-30 RX ADMIN — LIDOCAINE HYDROCHLORIDE 100 MG: 20 INJECTION, SOLUTION EPIDURAL; INFILTRATION; INTRACAUDAL; PERINEURAL at 07:42

## 2020-11-30 RX ADMIN — SUCCINYLCHOLINE CHLORIDE 180 MG: 20 INJECTION, SOLUTION INTRAMUSCULAR; INTRAVENOUS at 07:42

## 2020-11-30 RX ADMIN — MIDAZOLAM HYDROCHLORIDE 2 MG: 2 INJECTION, SOLUTION INTRAMUSCULAR; INTRAVENOUS at 07:37

## 2020-11-30 RX ADMIN — SODIUM CHLORIDE 1500 MG: 9 INJECTION, SOLUTION INTRAVENOUS at 17:00

## 2020-11-30 RX ADMIN — TRANEXAMIC ACID 1 G: 100 INJECTION, SOLUTION INTRAVENOUS at 12:25

## 2020-11-30 RX ADMIN — FENTANYL CITRATE 100 MCG: 50 INJECTION, SOLUTION INTRAMUSCULAR; INTRAVENOUS at 10:34

## 2020-11-30 RX ADMIN — ONDANSETRON 4 MG: 2 INJECTION INTRAMUSCULAR; INTRAVENOUS at 07:37

## 2020-11-30 RX ADMIN — SODIUM CHLORIDE 125 ML/HR: 9 INJECTION, SOLUTION INTRAVENOUS at 16:02

## 2020-11-30 RX ADMIN — KETOROLAC TROMETHAMINE 30 MG: 30 INJECTION, SOLUTION INTRAMUSCULAR at 18:03

## 2020-11-30 RX ADMIN — GABAPENTIN 300 MG: 300 CAPSULE ORAL at 06:37

## 2020-11-30 RX ADMIN — CEFAZOLIN SODIUM 2 G: 1 INJECTION, POWDER, FOR SOLUTION INTRAMUSCULAR; INTRAVENOUS at 12:11

## 2020-11-30 RX ADMIN — DOCUSATE SODIUM 50 MG AND SENNOSIDES 8.6 MG 1 TABLET: 8.6; 5 TABLET, FILM COATED ORAL at 20:22

## 2020-11-30 RX ADMIN — CEFAZOLIN 2 G: 10 INJECTION, POWDER, FOR SOLUTION INTRAVENOUS at 08:09

## 2020-11-30 RX ADMIN — FENTANYL CITRATE 100 MCG: 50 INJECTION, SOLUTION INTRAMUSCULAR; INTRAVENOUS at 07:15

## 2020-11-30 RX ADMIN — TRANEXAMIC ACID 1 G: 100 INJECTION, SOLUTION INTRAVENOUS at 08:09

## 2020-11-30 RX ADMIN — CELECOXIB 400 MG: 200 CAPSULE ORAL at 06:37

## 2020-11-30 RX ADMIN — CEFAZOLIN SODIUM 2 G: 2 INJECTION, SOLUTION INTRAVENOUS at 20:22

## 2020-11-30 RX ADMIN — DEXAMETHASONE SODIUM PHOSPHATE 4 MG: 4 INJECTION, SOLUTION INTRA-ARTICULAR; INTRALESIONAL; INTRAMUSCULAR; INTRAVENOUS; SOFT TISSUE at 13:05

## 2020-11-30 RX ADMIN — ACETAMINOPHEN 1000 MG: 500 TABLET, FILM COATED ORAL at 18:03

## 2020-11-30 NOTE — ANESTHESIA POSTPROCEDURE EVALUATION
Procedure(s):  Revision Left Total Knee Arthroplasty, With Removal Of Cement Spacer, With Interaoperative Frozen Sections.     general    Anesthesia Post Evaluation      Multimodal analgesia: multimodal analgesia used between 6 hours prior to anesthesia start to PACU discharge  Patient location during evaluation: PACU  Patient participation: complete - patient participated  Level of consciousness: awake  Pain score: 0  Pain management: adequate  Airway patency: patent  Anesthetic complications: no  Cardiovascular status: acceptable  Respiratory status: acceptable  Hydration status: acceptable  Post anesthesia nausea and vomiting:  controlled  Final Post Anesthesia Temperature Assessment:  Normothermia (36.0-37.5 degrees C)      INITIAL Post-op Vital signs:   Vitals Value Taken Time   /87 11/30/2020  1:30 PM   Temp 38.3 °C (100.9 °F) 11/30/2020  1:19 PM   Pulse 95 11/30/2020  1:30 PM   Resp 11 11/30/2020  1:30 PM   SpO2 97 % 11/30/2020  1:30 PM

## 2020-11-30 NOTE — PROGRESS NOTES
No open wounds or sores per skin assessment completed with Adrianne DAVIS on admission to [address] [place] [address]. Mepilex applied to sacral area, blanchable redness noted prior to placing dressing.

## 2020-11-30 NOTE — ANESTHESIA PROCEDURE NOTES
Peripheral Block    Start time: 11/30/2020 7:15 AM  End time: 11/30/2020 7:25 AM  Performed by: Abi Olivarez MD  Authorized by: Abi Olivarez MD       Pre-procedure: Indications: at surgeon's request, post-op pain management and procedure for pain    Preanesthetic Checklist: patient identified, risks and benefits discussed, site marked, timeout performed, anesthesia consent given and patient being monitored    Timeout Time: 07:15          Block Type:   Block Type:   Adductor canal  Laterality:  Left  Monitoring:  Standard ASA monitoring, continuous pulse ox, frequent vital sign checks, heart rate, oxygen and responsive to questions  Injection Technique:  Single shot  Procedures: ultrasound guided    Patient Position: supine  Prep: chlorhexidine    Needle Type:  Pajunk  Needle Gauge:  21 G  Needle Localization:  Anatomical landmarks, infiltration and ultrasound guidance  Medication Injected:  FentaNYL citrate (PF) injection sedation initial, 100 mcg  Med Admin Time: 11/30/2020 7:15 AM    Assessment:  Number of attempts:  1  Injection Assessment:  Incremental injection every 5 mL, local visualized surrounding nerve on ultrasound, negative aspiration for blood, no paresthesia, no intravascular symptoms and ultrasound image on chart  Patient tolerance:  Patient tolerated the procedure well with no immediate complications

## 2020-11-30 NOTE — OP NOTES
Operative Report    Patient: Kanchan Harris MRN: [id number]  SSN: [id number]    YOB: 1965  Age: 62 y.o. 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 Rodo Nassar, was indispensable for efficient and successful execution of this case, with active and critical participation in the critical steps of preparation of the joint and the bone bed, debridement, as well as critical assessment of trials and placement of final implants. Surgical Assistant: Surg Asst-1: Jose Hood. Anesthesia: General  With adductor canal block by Dr. Nguyễn Cazares    Procedure: Procedure(s):  Revision Left Total Knee Arthroplasty, With Removal Of Cement Spacer, With Interaoperative Frozen Sections     Findings: 1)  Moderate effusion noted intra-articular, with no evidence of acute inflammation or tissue edema. 2)   Mature and an inflamed synovial lining, with some localized tissue necrosis in the recesses and posterior capsular region, consistent with presence of cement spacer  3)  Multiple specimens were sent for frozen sections to pathology department. All of them were reported to be without any acute inflammation. One specimen only, from the intramedullary membrane of the tibial canal, was reported as showing 8-10 neutrophils.     IMPLANTS USED: From [employer] Revision total knee system including:    A] Femoral Implant  1) Revision CRS Left cemented femoral component size 8  2) Femoral Sleeve Porocoat Fully Coated: 55 mm  3) 12 mm distal  Femoral augment - Size 8 (placed laterally)  4) Revision Pressfit stem 16 mm x 110 mm    B] Tibial Implant  1) Tibial base RP, size 7 cemented  2) Tibial sleeve Porocoat Fully coated 69 mm  3) Revision Pressfit stem 14 mm x 110 mm    C] Revision CRS RP insert - size 8 x 20 mm    D] Medialized Dome cemented patella - 41 mm    E] Co-Cr Cable with sleeve (1.8 mm) for prophylactic cerclage of distal femur    F] Stimulan micro-crystalline calcium sulfate powder - 10 cc, mixed with 3 g of vancomycin powder, and formed into pellets. 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 The patient was fitted with an articulating cement spacer. Postoperatively the patient was kept on a long-term regimen of intravenous vancomycin under supervision of the infectious disease specialist Dr. Michele Vallejo.   The patient is serological  Markers of inflammation normalized, and a recent arthrocentesis of the knee showed findings that were not suggestive of inflammation, and cultures were negative to 5 days. The patient wished to proceed with a 2nd stage reimplant with revision to a total knee arthroplasty. Informed consent was obtained and documented. The patient appeared to understand that intraoperative findings may still dictate conversion to a 2nd spacer, and the increased risk of periprosthetic infection despite optimization of parameters. Procedure in Detail: The patient and operative site were identified in the preoperative holding area. After induction of anesthesia the patient was positioned supine on the OR table with a bump under the LEFT buttock. A high thigh tourniquet was set at 300 mmHg, and standard prepping and draping of the lower extremity is performed with chlorhexidine and ChloraPrep.  Intravenous cefazolin, and tranexamic acid was administered. After timeout was called, I inflated the tourniquet and reused the previous anterior midline surgical scar. A full-thickness fascia cutaneous plane was developed, and a medial parapatellar arthrotomy was performed medial to the patella. The joint showed a moderate effusion, and findings as noted above. A complete synovectomy was performed, medial and lateral gutters were recreated with removal of scar, and retropatellar scar was excised. The femoral implant as well as the tibial implant were easily removed. The entire joint was now systematically and thoroughly debrided. All scar was excised meticulously. The gutters were reconstituted. A complete lateral retinacular release was performed to free up the patella from the lateral retinacular scar. Stability of the knee was evaluated, along with flexion and extension gaps. Mild varus and stability was noted, consistent with attenuation of the lateral supporting structures. I now proceeded with thorough debridement of the intramedullary canal of both bones. Thorough and aggressive reverse curetting was performed, with removal of all loose debris in the canal. Thorough pulsatile irrigation was performed with pulsatile lavage. The canals were now plugged with Ray-Sherri sponges. The remaining joint was now thoroughly debrided and irrigated, removing all synovial tissue, scar and any visible granulation tissue. The bone surfaces of the tibia and femur were aggressively debrided with rongeurs and osteotomes. The patellar surface was likewise thoroughly debrided. During the process of surgical exposure, removal of articulating cement spacer, debridement of the joint and the intramedullary canals, multiple specimens of tissue were obtained for frozen sections, final definitive pathology specimens, as well as tissue cultures. After a complete and aggressive excisional debridement, all surfaces were thoroughly pulse irrigated and the joint was packed. All the frozen sections specimens were reported by pathology department. Findings were as noted above. We decided to proceed with 2nd stage revision arthroplasty. I proceeded with preparing the tibia. Intramedullary reaming was performed up to 15 mm, to accept a 14x110 mm stem extension. After conical metaphyseal reaming, broaching was performed for a tibial metaphyseal sleeve, using a 14 x 110 mm intramedullary stem extension. The final broach used was a 69 mm sleeve. The proximal tibia was trimmed to be flush with the superior surface of the broach. This took off approximately 3 mm of proximal tibia, from medial to lateral. The final trial inserted was a size 7 baseplate, with the 69 mm metaphyseal sleeve, and a 14 x 110 mm stem extension. Excellent fit was noted. The femur was prepared with intramedullary reaming up to 18 mm, to accept a 16 x 110 mm stem extension. This showed excellent intramedullary engagement. Metaphyseal broaching was now performed for the femoral metaphyseal sleeves. The 55 mm broach showed excellent engagement and the metaphysis. While broaching the femur, I first inserted a prophylactic cobalt chrome braided cable with a sleeve around the metaphysis, to prevent inadvertent cortical splits. Provisional trials were used, and I upsized to a #8 femoral component, with augments as noted above. Rotational control was provided with excellent seating on the posteromedial femoral condyle. Extensive bone loss of the lateral femoral condyle precluded any structural support to the prosthesis directly, other than on the lateral distal augment. With a 20 mm insert, excellent balance of flexion and extension gaps was noted. Patellar tracking was excellent, and range of motion was from full extension to 105° of flexion. Stability was  Satisfactory throughout the range of motion, although with mild varus laxity noted. I decided to use constrained condylar implant as my final implant of choice.     The preformed pellets of Stimulan and vancomycin, were now equally divided and placed in the depth of the tibial and femoral intramedullary canals. The final femoral and tibial components were now assembled the side table, with components as noted above. Please note that the tourniquet was deflated for an interval of approximately 40 minutes, while the femoral preparation and the final trials were being conducted. After all the components were assembled on the side table, the joint was thoroughly pulse irrigated and the tourniquet was reinflated. The tibial component was inserted with cement placed only on the undersurface of the baseplate. The metaphyseal sleeve showed excellent knee engagement. This was followed by insertion of the femoral component in appropriate rotational alignment, with cementation of the condyles, keeping the metaphyseal sleeve clear of cement. All extra cement was removed. The knee was placed in full extension with a trial insert. The patellar component was cemented securely. After final trials, I chose the 20 mm CRS RP polyethylene insert. This was inserted, and the knee was relocated. Excellent stability and range of motion 0° to 105° was obtained. Thorough pulsatile irrigation was performed. An indwelling 3/16 inch drain was inserted into the joint. Closure was performed in layers, with standard technique. The skin was closed with 2-0 nylon sutures,  A EDWARD vacuum dressing was applied, followed by a compressive dressing. The tourniquet was deflated. The patient tolerated the procedure well and was transferred to the recovery room in stable condition. Postoperatively, the patient will be started on a standard total knee rehabilitation protocol with weightbearing as tolerated. Dr. Chano Salguero from Infectious Diseases will be consulted again for her rain put. I have suggested continuing intravenous vancomycin for 2-3 weeks, while awaiting final intraoperative cultures.     Specimen: Multiple soft tissue and bone specimens for pathology and cultures. Disposition: PACU, stable.     Estimated Blood Loss:  300 cc    Tourniquet Time:   Total Tourniquet Time Documented:  Thigh (Left) - 28 minutes  Thigh (Left) - 33 minutes  Thigh (Left) - 62 minutes  Total: Thigh (Left) - 123 minutes           Specimens:   ID Type Source Tests Collected by Time Destination   1 : Left Knee tissue Frozen Section Knee, left  Ed Hermosillo MD 11/30/2020 7177 Pathology   2 : Left Knee Scar Tissue #2 Frozen Section Tissue  Ed Hermosillo MD 11/30/2020 5609 Pathology   3 : Left Knee Tissue Preservative Knee, left  Ed Hermosillo MD 11/30/2020 9143 Pathology   4 : Left Knee Fenural membrane Frozen Section Knee, left  Ed Hermosillo MD 11/30/2020 3640 Pathology   5 : Left Knee Tibial membrane Frozen Section Knee, left  Ed Hermosillo MD 11/30/2020 9796 Pathology   6 : Left Knee femural membrane Preservative Knee, left  Ed Hermosillo MD 11/30/2020 0048 Pathology   7 : Left Knee Tibial membrane Preservative Knee, left  Ed Hermosillo MD 11/30/2020 2839 Pathology   1 : Anibal Hayward Fluid (Left Knee) Wound Knee  CULTURE, ANAEROBIC, CULTURE, WOUND W Marzena Mcgill MD 11/30/2020 0845 Microbiology   2 : Tissue Culture left Knee Tissue Knee, left CULTURE, ANAEROBIC, CULTURE, TISSUE W Marzena Mcgill MD 11/30/2020 1047 Microbiology   3 : Left Knee Bone  Tissue Knee, left CULTURE, ANAEROBIC, CULTURE, TISSUE W Marzena Mcgill MD 11/30/2020 2135 Microbiology   4 : Left Knee Femural membrane Tissue Knee, left CULTURE, ANAEROBIC, CULTURE, TISSUE W Marzena cMgill MD 11/30/2020 2032 Microbiology   5 : Left knee Tibial membrane Tissue Knee, left CULTURE, ANAEROBIC, CULTURE, TISSUE W Marzena Mcgill MD 11/30/2020 7869 Microbiology         Drains: None                Complications: None    Counts: Sponge and needle counts were correct times two.     Signed By:  Mansi Kumar MD     November 30, 2020

## 2020-12-01 VITALS
TEMPERATURE: 97.7 F | SYSTOLIC BLOOD PRESSURE: 122 MMHG | OXYGEN SATURATION: 95 % | BODY MASS INDEX: 30.65 KG/M2 | RESPIRATION RATE: 18 BRPM | DIASTOLIC BLOOD PRESSURE: 74 MMHG | HEART RATE: 93 BPM | WEIGHT: 214.07 LBS

## 2020-12-01 LAB
ANION GAP SERPL CALC-SCNC: 7 MMOL/L (ref 5–15)
BUN SERPL-MCNC: 26 MG/DL (ref 6–20)
BUN/CREAT SERPL: 19 (ref 12–20)
CA-I BLD-MCNC: 7.7 MG/DL (ref 8.5–10.1)
CHLORIDE SERPL-SCNC: 108 MMOL/L (ref 97–108)
CO2 SERPL-SCNC: 27 MMOL/L (ref 21–32)
CREAT SERPL-MCNC: 1.39 MG/DL (ref 0.7–1.3)
DATE LAST DOSE: ABNORMAL
GLUCOSE SERPL-MCNC: 149 MG/DL (ref 65–100)
HGB BLD-MCNC: 9.6 G/DL (ref 12.1–17)
POTASSIUM SERPL-SCNC: 4.2 MMOL/L (ref 3.5–5.1)
REPORTED DOSE,DOSE: ABNORMAL UNITS
SODIUM SERPL-SCNC: 142 MMOL/L (ref 136–145)
VANCOMYCIN TROUGH SERPL-MCNC: 14.1 UG/ML (ref 5–10)

## 2020-12-01 PROCEDURE — 97110 THERAPEUTIC EXERCISES: CPT

## 2020-12-01 PROCEDURE — 97161 PT EVAL LOW COMPLEX 20 MIN: CPT

## 2020-12-01 PROCEDURE — 80202 ASSAY OF VANCOMYCIN: CPT

## 2020-12-01 PROCEDURE — 74011250636 HC RX REV CODE- 250/636: Performed by: ORTHOPAEDIC SURGERY

## 2020-12-01 PROCEDURE — 97530 THERAPEUTIC ACTIVITIES: CPT

## 2020-12-01 PROCEDURE — 97165 OT EVAL LOW COMPLEX 30 MIN: CPT

## 2020-12-01 PROCEDURE — 36415 COLL VENOUS BLD VENIPUNCTURE: CPT

## 2020-12-01 PROCEDURE — 36569 INSJ PICC 5 YR+ W/O IMAGING: CPT

## 2020-12-01 PROCEDURE — 85018 HEMOGLOBIN: CPT

## 2020-12-01 PROCEDURE — 74011250636 HC RX REV CODE- 250/636: Performed by: INTERNAL MEDICINE

## 2020-12-01 PROCEDURE — 80048 BASIC METABOLIC PNL TOTAL CA: CPT

## 2020-12-01 PROCEDURE — 74011250637 HC RX REV CODE- 250/637: Performed by: ORTHOPAEDIC SURGERY

## 2020-12-01 PROCEDURE — 97116 GAIT TRAINING THERAPY: CPT

## 2020-12-01 PROCEDURE — 99231 SBSQ HOSP IP/OBS SF/LOW 25: CPT | Performed by: INTERNAL MEDICINE

## 2020-12-01 RX ORDER — LANOLIN ALCOHOL/MO/W.PET/CERES
325 CREAM (GRAM) TOPICAL 2 TIMES DAILY WITH MEALS
Qty: 14 TAB | Refills: 0 | Status: SHIPPED | OUTPATIENT
Start: 2020-12-01 | End: 2020-12-08

## 2020-12-01 RX ORDER — OXYCODONE HYDROCHLORIDE 5 MG/1
5 TABLET ORAL
Qty: 20 TAB | Refills: 0 | Status: SHIPPED | OUTPATIENT
Start: 2020-12-01 | End: 2020-12-05

## 2020-12-01 RX ORDER — LANOLIN ALCOHOL/MO/W.PET/CERES
1 CREAM (GRAM) TOPICAL 2 TIMES DAILY WITH MEALS
Status: DISCONTINUED | OUTPATIENT
Start: 2020-12-01 | End: 2020-12-01 | Stop reason: HOSPADM

## 2020-12-01 RX ORDER — ACETAMINOPHEN 500 MG
1000 TABLET ORAL
Qty: 30 TAB | Refills: 0 | Status: SHIPPED
Start: 2020-12-01

## 2020-12-01 RX ADMIN — DOCUSATE SODIUM 50 MG AND SENNOSIDES 8.6 MG 1 TABLET: 8.6; 5 TABLET, FILM COATED ORAL at 09:00

## 2020-12-01 RX ADMIN — SODIUM CHLORIDE 1500 MG: 9 INJECTION, SOLUTION INTRAVENOUS at 05:15

## 2020-12-01 RX ADMIN — KETOROLAC TROMETHAMINE 30 MG: 30 INJECTION, SOLUTION INTRAMUSCULAR at 06:43

## 2020-12-01 RX ADMIN — CEFAZOLIN SODIUM 2 G: 2 INJECTION, SOLUTION INTRAVENOUS at 03:59

## 2020-12-01 RX ADMIN — ACETAMINOPHEN 1000 MG: 500 TABLET, FILM COATED ORAL at 12:05

## 2020-12-01 RX ADMIN — ACETAMINOPHEN 1000 MG: 500 TABLET, FILM COATED ORAL at 06:42

## 2020-12-01 RX ADMIN — KETOROLAC TROMETHAMINE 30 MG: 30 INJECTION, SOLUTION INTRAMUSCULAR at 00:04

## 2020-12-01 RX ADMIN — ACETAMINOPHEN 1000 MG: 500 TABLET, FILM COATED ORAL at 00:04

## 2020-12-01 RX ADMIN — TRAMADOL HYDROCHLORIDE 50 MG: 50 TABLET, FILM COATED ORAL at 10:27

## 2020-12-01 NOTE — PROGRESS NOTES
OCCUPATIONAL THERAPY EVALUATION  Patient: Savita Magana (39 y.o. 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 Orders to be WBAT in L LE. Pt received sitting up in recliner chair upon arrival, AXO x4, and agreeable to OT evaluation at this time. Per pt, he lives with his spouse in a two-story home (13 [address] interior with L HR, 5 [address] exterior R HR) and was IND with ADLs and Mod I using SPC on occasion (w/c for community mobility long distances) at [facility]. Based on current observations, pt currently presents with deficits in generalized strength/AROM L LE, dynamic standing balance, and L knee pain impacting overall performance of ADLs and functional transfers/mobility. Pt currently requires SBA for functional sit><stand transfers to/from chair and toilet with RW, good safety awareness and hand placement noted throughout transfers. Pt able to demonstrate donning L sock without difficulty, and basic grooming at sink SBA with RW. Pt educated on compensatory dressing techniques and home safety with good understanding verbalized/demonstrated throughout evaluation.  Pt would benefit from continued skilled OT services during hospital stay and at next level of care to address current impairments and improve overall IND and safety with self cares and functional mobility upon d/c. OT recommending HHOT at d/c once medically appropriate. Other factors to consider for discharge: Good family support, hx of B TKA/familiarity with recovery, DME     Patient will benefit from skilled therapy intervention to address the above noted impairments. PLAN :  Recommendations and Planned Interventions: self care training, functional mobility training, therapeutic exercise, balance training, therapeutic activities, endurance activities, patient education, and home safety training    Frequency/Duration: Patient will be followed by occupational therapy 5 times a week to address goals. Recommendation for discharge: (in order for the patient to meet his/her long term goals)  Occupational therapy at least 2 days/week in the home     This discharge recommendation:  Has been made in collaboration with the attending provider and/or case management    IF patient discharges home will need the following DME: patient owns DME required for discharge       SUBJECTIVE:   Patient stated i've done all of this before.     OBJECTIVE DATA SUMMARY:   HISTORY:   Past Medical History:   Diagnosis Date    Arthritis     Cancer ([facility] [address]     skin cancers - check every 6 months     Migraine headache     Staphylococcal infection     methicillin resistant staphlococcus epidermidis pt left knee Aug 2020     Past Surgical History:   Procedure Laterality Date    HX ACL RECONSTRUCTION Left 08/10/2020    HX ACL RECONSTRUCTION Right     HX KNEE ARTHROSCOPY Left     Jan 2020    HX KNEE REPLACEMENT Bilateral     2017 and  left knee revision Oct 2019    HX OTHER SURGICAL      total knee explant Aug 2020    HX OTHER SURGICAL      various skin cancers removed from upper body       Expanded or extensive additional review of patient history:     Home Situation  Home Environment: Private residence  # Steps to Enter: 5  Rails to Enter: Yes  Hand Rails : Right  One/Two Story Residence: Two story  # of Interior Steps: 15  Interior Rails: Left  Living Alone: No  Support Systems: Spouse/Significant Other/Partner  Patient Expects to be Discharged to[de-identified] Private residence  Current DME Used/Available at Home: Walker, rolling, Crutches, Cane, straight, Wheelchair  Tub or Shower Type: Tub/Shower combination    EXAMINATION OF PERFORMANCE DEFICITS:  Cognitive/Behavioral Status:  Neurologic State: Alert  Orientation Level: Oriented X4  Cognition: Follows commands; Appropriate decision making; Appropriate safety awareness    Hearing: Auditory  Auditory Impairment: None    Vision/Perceptual:     WFL      Range of Motion:  AROM: Within functional limits    Strength:  Strength: Within functional limits    Coordination:     Fine Motor Skills-Upper: Left Intact; Right Intact    Gross Motor Skills-Upper: Left Intact; Right Intact    Tone & Sensation:  WFL    Balance:  Sitting: Intact  Standing: Intact; With support    Functional Mobility and Transfers for ADLs:  Bed Mobility:  Scooting: Modified independent    Transfers:  Sit to Stand: Stand-by assistance  Stand to Sit: Stand-by assistance  Bed to Chair: Stand-by assistance  Bathroom Mobility: Stand-by assistance  Toilet Transfer : Stand-by assistance    ADL Intervention and task modifications:  Grooming  Grooming Assistance: Stand-by assistance  Position Performed: Standing  Washing Hands: Stand-by assistance    Lower Body Dressing Assistance  Socks: Stand-by assistance  Position Performed: Seated in chair    Therapeutic Exercise:  Pt would benefit from UE HEP to improve overall UE AROM/strength and can be further educated in next treatment session. Functional Measure:    [facility] [address] [address] AM-PACTM \"6 Clicks\"                                                       Daily Activity Inpatient Short Form  How much help from another person does the patient currently need. .. Total; A Lot A Little None   1. Putting on and taking off regular lower body clothing? []  1 []  2 [x]  3 []  4   2. Bathing (including washing, rinsing, drying)? []  1 []  2 [x]  3 []  4   3. Toileting, which includes using toilet, bedpan or urinal? [] 1 []  2 [x]  3 []  4   4. Putting on and taking off regular upper body clothing? []  1 []  2 []  3 [x]  4   5. Taking care of personal grooming such as brushing teeth? []  1 []  2 [x]  3 []  4   6. Eating meals? []  1 []  2 []  3 [x]  4   © , Trustees of [address] [place] [address] [address] [address], under license to [employer]. All rights reserved     Score: 20/24     Interpretation of Tool:  Represents clinically-significant functional categories (i.e. Activities of daily living). Percentage of Impairment CH    0%   CI    1-19% CJ    20-39% CK    40-59% CL    60-79% CM    80-99% CN     100%   AMPA  Score 6-24 24 23 20-22 15-19 10-14 7-9 6        Occupational Therapy Evaluation Charge Determination   History Examination Decision-Making   LOW Complexity : Brief history review  LOW Complexity : 1-3 performance deficits relating to physical, cognitive , or psychosocial skils that result in activity limitations and / or participation restrictions  LOW Complexity : No comorbidities that affect functional and no verbal or physical assistance needed to complete eval tasks       Based on the above components, the patient evaluation is determined to be of the following complexity level: LOW   Pain Ratin/10    Activity Tolerance:   Good    After treatment patient left in no apparent distress:    Sitting in chair and Call bell within reach    COMMUNICATION/EDUCATION:   The patients plan of care was discussed with: Registered nurse. Patient/family have participated as able in goal setting and plan of care. and Patient/family agree to work toward stated goals and plan of care. This patients plan of care is appropriate for delegation to [facility].     Thank you for this referral.  Regino Ko  Time Calculation: 23 mins   Problem: Self Care Deficits Care Plan (Adult)  Goal: *Acute Goals and Plan of Care (Insert Text)  Description: Pt will be IND sup <> sit in prep for EOB ADLs  Pt will be Mod I grooming standing sink side LRAD  Pt will be Mod I LE dressing sitting EOB/long sit  Pt will be Mod I sit <>  prep for toileting LRAD  Pt will be Mod I toileting/toilet transfer/cloth mgmt LRAD  Pt will be IND following UE HEP in prep for self care tasks     Outcome: Not Met

## 2020-12-01 NOTE — PROGRESS NOTES
Vancomycin Update:12/01/20  Day:2  Vancomycin Trough that was done today was not ordered by the Pharmacy. The lab was drawn today before the 3 rd dose which cannot be portrayed as an actual Trough. Today pt will receive the regimental dose 1500 mg q12h. Ordered TROUGH for Tomorrow AM before the 4th Dose. Pharmacy to follow daily and will make changes to dose and/or frequency based on clinical status.

## 2020-12-01 NOTE — PROGRESS NOTES
Problem: Mobility Impaired (Adult and Pediatric)  Goal: *Acute Goals and Plan of Care (Insert Text)  Description: Amb 300ft with RW and SBAx1 x7 days  Ascend/descend 4 steps with rails and SBAx1- goal met  SBA with all transfers - goal met  Outcome: Progressing Towards Goal    PHYSICAL THERAPY EVALUATION  Patient: Jordyn Conner (26 y.o. 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 Pt lives with spouse in 2 story home with 13 steps upstairs with rail on L and 5 steps to enter home with rail on R. PTA pt was I with ADLs and using Rw/crutches/w/c for mobility. Currently, pt is SBA with transfers. Pt able to amb approx 250ft with RW WBAT and SBAx1. Pt was able to ascend/descend 4 steps with rails and SBAx1. Pt may benefit from skilled PT to address his functional deficits. Recommend d/c with HHPT upon d/c at this time. PLAN :  Recommendations and Planned Interventions: bed mobility training, transfer training, gait training, therapeutic exercises, patient and family training/education, and therapeutic activities      Frequency/Duration: Patient will be followed by physical therapy:  twice daily to address goals.     Recommendation for discharge: (in order for the patient to meet his/her long term goals)  HHPT             SUBJECTIVE: Patient stated he is doing well and eager to go home today    OBJECTIVE DATA SUMMARY:   HISTORY:    Past Medical History:   Diagnosis Date    Arthritis     Cancer ([facility] [address]     skin cancers - check every 6 months     Migraine headache     Staphylococcal infection     methicillin resistant staphlococcus epidermidis pt left knee Aug 2020     Past Surgical History:   Procedure Laterality Date    HX ACL RECONSTRUCTION Left 08/10/2020    HX ACL RECONSTRUCTION Right     HX KNEE ARTHROSCOPY Left     Jan 2020    HX KNEE REPLACEMENT Bilateral     2017 and  left knee revision Oct 2019    HX OTHER SURGICAL      total knee explant Aug 2020    HX OTHER SURGICAL      various skin cancers removed from upper body       Personal factors and/or comorbidities impacting plan of care:     Home Situation  Home Environment: Private residence  # Steps to Enter: 5  Rails to Enter: Yes  Hand Rails : Right  One/Two Story Residence: Two story  # of Interior Steps: 15  Interior Rails: Left  Living Alone: No  Support Systems: Spouse/Significant Other/Partner  Patient Expects to be Discharged to[de-identified] Private residence  Current DME Used/Available at Home: [employer], rolling, [address], straight, Wheelchair  Tub or Shower Type: Tub/Shower combination        EXAMINATION/PRESENTATION/DECISION MAKING:   Critical Behavior:  Neurologic State: Alert  Orientation Level: Oriented X4  Cognition: Follows commands, Appropriate decision making, Appropriate safety awareness     Hearing: Auditory  Auditory Impairment: None    Range Of Motion:  AROM: Within functional limits except L knee ROM limited   Strength:    Strength: Within functional limits except L LE NT                Functional Mobility:    Transfers:  Sit to Stand: Stand-by assistance  Stand to Sit: Stand-by assistance      Balance:   Sitting: Intact  Standing: Intact; With support  Ambulation/Gait Training:  Distance (ft): 250 Feet (ft)  Assistive Device: Walker, rolling  Ambulation - Level of Assistance: Stand-by assistance   No LOB, no buckling of knee noted   Stairs:  Number of Stairs Trained: 4  Stairs - Level of Assistance: Stand-by assistance          Functional Measure:    [facility] AM-PAC 6 Clicks         Basic Mobility Inpatient Short Form  How much difficulty does the patient currently have. .. Unable A Lot A Little None   1. Turning over in bed (including adjusting bedclothes, sheets and blankets)? [] 1   [] 2   [x] 3   [] 4   2. Sitting down on and standing up from a chair with arms ( e.g., wheelchair, bedside commode, etc.)   [] 1   [] 2   [] 3   [x] 4   3. Moving from lying on back to sitting on the side of the bed? [] 1   [] 2   [x] 3   [] 4          How much help from another person does the patient currently need. .. Total A Lot A Little None   4. Moving to and from a bed to a chair (including a wheelchair)? [] 1   [] 2   [] 3   [x] 4   5. Need to walk in hospital room? [] 1   [] 2   [] 3   [x] 4   6. Climbing 3-5 steps with a railing? [] 1   [] 2   [x] 3   [] 4   © , Trustees of [facility], under license to [employer]. All rights reserved     Score:  Initial: 21 Most Recent: X (Date: -- )   Interpretation of Tool:  Represents activities that are increasingly more difficult (i.e. Bed mobility, Transfers, Gait).   Score 24 23 22-20 19-15 14-10 9-7 6   Modifier CH CI CJ CK CL CM CN          Physical Therapy Evaluation Charge Determination   History Examination Presentation Decision-Making   MEDIUM  Complexity : 1-2 comorbidities / personal factors will impact the outcome/ POC  MEDIUM Complexity : 3 Standardized tests and measures addressing body structure, function, activity limitation and / or participation in recreation  LOW Complexity : Stable, uncomplicated  Other Functional Measure AMPA 6 LOW      Based on the above components, the patient evaluation is determined to be of the following complexity level: LOW     Pain Ratin/10    Activity Tolerance:   Good  Please refer to the flowsheet for vital signs taken during this treatment. After treatment patient left in no apparent distress:   Sitting in chair and Call bell within reach    COMMUNICATION/EDUCATION:   The patients plan of care was discussed with: Registered nurse. Fall prevention education was provided and the patient/caregiver indicated understanding.     Thank you for this referral.  Breezy Donald   Time Calculation: 20 mins

## 2020-12-01 NOTE — PROGRESS NOTES
PICC Placement Note    PRE-PROCEDURE VERIFICATION  Correct Procedure: yes  Correct Site:  yes  Temperature: Temp: 97.7 °F (36.5 °C), Temperature Source: Temp Source: Oral  Recent Labs     12/01/20  0525   BUN 26*   CREA 1.39*     Allergies: Patient has no known allergies. Education materials for PICC Care given to family: yes. See Patient Education activity for further details. PICC Booklet placed on chart: yes    PROCEDURE DETAIL  A single lumen PICC line was started for antibiotic therapy.  The following documentation is in addition to the PICC properties in the lines/airways flowsheet :  Lot #: [id number]  xylocaine used: yes  Mid-Arm Circumference: 36 (cm)  Internal Catheter Length: 41 (cm)  Internal Catheter Total Length: 41 (cm)  Vein Selection for PICC:right basilic  Central Line Bundle followed yes  Complication Related to Insertion: none    The placement was verified by X-ray: no. Placed with ecg Line is okay to use: yes    Buena Harada, RN

## 2020-12-01 NOTE — CONSULTS
Infectious Disease Consult Note    Reason for Consult:  Left prosthetic knee infection   Date of Consultation: November 30, 2020  Date of Admission: 11/30/2020  Referring Physician:  Orthopedics       HPI:  38-year-old  male familiar to me from a prior admission to [facility] in 08/2020 w left prosthetic knee infection for which he underwent  debridement w prosthesis explantation an placement of antibiotic spacer on 8/10 by Dr. Justo Howard. 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 He completed a 6 wk course of Vancomycin post-op w normalized inflammatory markers ESR 9 and CRP 3.37 in 09/2020. He underwent re-implantation of his left knee prosthesis today, received pal-operative Vanc. I have been asked to see pt for continuity of care. Ortho recommending IV antibiotics post-op. He is afebrile, routine labs not done.      Review of Systems:     Gen: Negative for chills, fevers, weight loss, weight gain   HEENT: Negative for headache, vision changes, ear ache or discharge   CV:  Negative for chest pain, dyspnea on exertion, leg edema   Lungs: Negative for shortness of breath, cough, wheezing   Abdomen: Negative for abdominal pain, nausea, vomiting, diarrhea, constipation   Genitourinary: Negative for genital pain or genital discharge     Neuro: Negative for headache, numbness, tingling, extremity weakness   Skin: Negative for rash, sores/open wounds   Musculoskeletal: Negative for joint pain, left knee pain   Psych: Negative for manic behavior       Past Medical History:  Past Medical History:   Diagnosis Date    Arthritis     Cancer ([address]     skin cancers - check every 6 months     Migraine headache     Staphylococcal infection     methicillin resistant staphlococcus epidermidis pt left knee Aug 2020       Past surgical history   Past Surgical History:   Procedure Laterality Date    HX ACL RECONSTRUCTION Left 08/10/2020    HX ACL RECONSTRUCTION Right     HX KNEE ARTHROSCOPY Left     Jan 2020    HX KNEE REPLACEMENT Bilateral     2017 and  left knee revision Oct 2019    HX OTHER SURGICAL      total knee explant Aug 2020    HX OTHER SURGICAL      various skin cancers removed from upper body        Social History   Social History     Socioeconomic History    Marital status:      Spouse name: Not on file    Number of children: Not on file    Years of education: Not on file    Highest education level: Not on file   Occupational History    Not on file   Social Needs    Financial resource strain: Not on file    Food insecurity     Worry: Not on file     Inability: Not on file    Transportation needs     Medical: Not on file     Non-medical: Not on file   Tobacco Use    Smoking status: Former Smoker    Smokeless tobacco: Never Used   Substance and Sexual Activity    Alcohol use: Not Currently    Drug use: Never    Sexual activity: Not on file   Lifestyle    Physical activity     Days per week: Not on file     Minutes per session: Not on file    Stress: Not on file   Relationships    Social connections     Talks on phone: Not on file     Gets together: Not on file     Attends [employer] service: Not on file     Active member of club or organization: Not on file     Attends meetings of clubs or organizations: Not on file     Relationship status: Not on file    Intimate partner violence     Fear of current or ex partner: Not on file     Emotionally abused: Not on file     Physically abused: Not on file     Forced sexual activity: Not on file   Other Topics Concern    Not on file   Social History Narrative    Not on file        Allergies:  No Known Allergies      Medications:  No current facility-administered medications on file prior to encounter.       Current Outpatient Medications on File Prior to Encounter   Medication Sig Dispense Refill    naproxen (NAPROSYN) 500 mg tablet Take 500 mg by mouth. As needed      rizatriptan (Maxalt-MLT) 10 mg disintegrating tablet Take 10 mg by mouth once as needed. Physical Exam:    Vitals:   Patient Vitals for the past 24 hrs:   Temp Pulse Resp BP SpO2   11/30/20 1917  96 16 113/70 94 %   11/30/20 1827 97.7 °F (36.5 °C) 99 16 106/69 95 %   11/30/20 1630 97.8 °F (36.6 °C) 97 16 95/68 97 %   11/30/20 1530 98 °F (36.7 °C) (!) 106 16 102/70 95 %   11/30/20 1425 97.5 °F (36.4 °C) (!) 105 18 100/65 95 %   11/30/20 1417  90 14 111/77    11/30/20 1402  95 13 114/81 93 %   11/30/20 1346 99 °F (37.2 °C) 100 14 114/86 95 %   11/30/20 1330  95 11 116/87 97 %   11/30/20 1329  93 12 118/88 97 %   11/30/20 1322  96 12 120/89 98 %   11/30/20 1319 (!) 100.9 °F (38.3 °C) 100 11 (!) 122/91 97 %   11/30/20 1316 (!) 100.9 °F (38.3 °C) 95 10 (!) 122/91 98 %   11/30/20 0731  60 15 111/75 95 %   11/30/20 0726  66 20 117/83 96 %   11/30/20 0715 98.2 °F (36.8 °C) 67 14 (!) 126/97 98 %   11/30/20 0640 98 °F (36.7 °C) 73 16 137/82 97 %   ·   · GEN: NAD, AAO x 4  · HEENT: NCAT, PERRLA  · HEART: S1, S2+, RRR, No murmur   · Lungs: CTA B/l, no wheeze/rhonchi   · Abdomen: soft, ND, NT, +BS   · Genitourinary: no genital discharge, no roa  · Extremities: Left knee dressing in place   · Skin: no rash, no chronic wounds or ulcers       Labs:   No results for input(s): WBC, HGB, HCT, PLT, HGBEXT, HCTEXT, PLTEXT in the last 72 hours. No results for input(s): BUN, CREA in the last 72 hours. Lab Results   Component Value Date/Time    C-Reactive protein 3.37 (H) 09/21/2020 08:30 AM      No results found for:        Microbiology Data:  - Intra-op Cx 11/30: Pending       Imaging:   - Left knee X-ray 11/30: Hardware noted left knee; placement as determined by orthopedic surgery. Drain  in place. Assessment / Plan:     1.  H/o left prosthetic infection in 08/2020, s/p 6 wks of Vanc targetting Staph Epi       Underwent left knee re-implantation today. Afebrile, routine labs not done       No strong indication for long term IV antibiotic but reasonable to continue Vanc for 3-4 wks post-op followed by long term oral suppressive therapy w doxycycline       CRP and ESR w AM labs, will likely be elevated from recent surgery      PICC line placement in AM  for out pt antibiotics. Home abx script and lab orders on chart     2.  Left knee pain: Mgt per primary     Alec Baltazar MD     11/30/2020

## 2020-12-01 NOTE — PROGRESS NOTES
Pt discharged home per md order. Discharge instructions reviewed with pt including details on follow up appt date time and location, medications and all last and next dosage times. Education explained with teach back and printed education given for dvt prevention , post surgical instructions, and s/s of post op infection. Pt wheeled to wife private vehicle for transport home.

## 2020-12-01 NOTE — PROGRESS NOTES
Bedside shift change report given to Hector Godwin (oncoming nurse) by Bart Bowling (offgoing nurse). Report included the following information SBAR.

## 2020-12-01 NOTE — PROGRESS NOTES
Problem: Mobility Impaired (Adult and Pediatric)  Goal: *Acute Goals and Plan of Care (Insert Text)  Description: Amb 300ft with RW and SBAx1 x7 days - goal met  Ascend/descend 4 steps with rails and SBAx1- goal met  SBA with all transfers - goal met  12/1/2020 0953 by Amy Rosen  Outcome: Progressing Towards Goal     PHYSICAL THERAPY TREATMENT  Patient: Mary Rahman (89 y.o. 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 ASSESSMENT  Patient continues with skilled PT services and is progressing towards goals. Pt up amb from bathroom upon PT arrival. Pt able to amb approx 300ft with RW and SBAx1. Pt was instructed in LE exercises per TKR protocol and did well with exercises (see chart below). ROM -5-80deg L knee flex/ext. Overall pt did well with therapy and met acute PT goals. No further acute PT indicated at this time. PLAN :  D/C PT post session, no further acute PT needs.      Recommendation for discharge: (in order for the patient to meet his/her long term goals)  HHPT         SUBJECTIVE:   Patient stated he is feeling pretty good    OBJECTIVE DATA SUMMARY:   Critical Behavior:  Neurologic State: Alert  Orientation Level: Oriented X4  Cognition: Follows commands, Appropriate decision making, Appropriate safety awareness     Functional Mobility Training:  Pt up amb in room upon PT arrival    Ambulation/Gait Training:  Ambulated 300ft with RW and SBAx1  Assistive Device: Walker, rolling  Ambulation - Level of Assistance: Stand-by assistance        Therapeutic Exercises:   Therapeutic Exercises:       EXERCISE   Sets   Reps   Active Active Assist   Passive Self ROM   Comments   Ankle Pumps 3 10 [x] [] [] []    Quad Sets/Glut Sets 3 10 [x] [] [] []    Hamstring Sets   [] [] [] []    Short Arc Quads 3 10 [x] [] [] []    Heel Slides 3 10 [x] [] [] []    Straight Leg Raises   [] [] [] []    Hip abd/add 3 10 [x] [] [] []    Long Arc Quads   [] [] [] []    Marching   [] [] [] []       [] [] [] []       Pain Ratin/10    Activity Tolerance:   Good  Please refer to the flowsheet for vital signs taken during this treatment. After treatment patient left in no apparent distress:   Sitting in chair and Call bell within reach    COMMUNICATION/COLLABORATION:   The patients plan of care was discussed with: Registered nurse.      Imani Vera   Time Calculation: 30 mins

## 2020-12-01 NOTE — PROGRESS NOTES
CM met with patient to discuss DCP, patient aware he will need IV ABX and HH once DC, was current with [facility] and [facility] prior to admission, patient gave CM permission to send referrals, referrals sent. Awaiting acceptance.

## 2020-12-01 NOTE — ROUTINE PROCESS
Bedside shift change report given to negrito domingo rn (oncoming nurse) by Jenn Bustillos rn (offgoing nurse). Report included the following information SBAR.

## 2020-12-01 NOTE — PROGRESS NOTES
Orthopedic progress note    Date:2020       Room:[facility]  Patient Fang Bal     YOB: 1965     Age:54 y.o. Subjective    62year old male status post removal of antibiotic spacer with second stage reimplantation of total knee arthroplasty left knee. P.O.D.#1  Patient doing well. He complains of minimal pain of his left knee. Pain medication helps. He has ambulated with therapy and tolerated well. Nursing reports high drainage output this morning in Hemovac. No other complaints at this time.   Objective           Vitals Last 24 Hours:  TEMPERATURE:  Temp  Av.4 °F (36.9 °C)  Min: 97.5 °F (36.4 °C)  Max: 100.9 °F (38.3 °C)  RESPIRATIONS RANGE: Resp  Av.5  Min: 10  Max: 18  PULSE OXIMETRY RANGE: SpO2  Av.5 %  Min: 93 %  Max: 98 %  PULSE RANGE: Pulse  Av.6  Min: 74  Max: 106  BLOOD PRESSURE RANGE: Systolic (11QXD), IEM:730 , Min:95 , WRZ:797   ; Diastolic (69QRA), BUH:69, Min:61, Max:91    Current Facility-Administered Medications   Medication Dose Route Frequency    0.9% sodium chloride infusion  125 mL/hr IntraVENous CONTINUOUS    sodium chloride 0.9 % bolus infusion 500 mL  500 mL IntraVENous ONCE PRN    sodium chloride (NS) flush 5-40 mL  5-40 mL IntraVENous Q8H    sodium chloride (NS) flush 5-40 mL  5-40 mL IntraVENous PRN    naloxone (NARCAN) injection 0.4 mg  0.4 mg IntraVENous PRN    senna-docusate (PERICOLACE) 8.6-50 mg per tablet 1 Tab  1 Tab Oral BID    polyethylene glycol (MIRALAX) packet 17 g  17 g Oral DAILY    [START ON 2020] bisacodyL (DULCOLAX) suppository 10 mg  10 mg Rectal DAILY PRN    acetaminophen (TYLENOL) tablet 1,000 mg  1,000 mg Oral Q6H    traMADoL (ULTRAM) tablet 50 mg  50 mg Oral Q4H PRN    oxyCODONE IR (ROXICODONE) tablet 5 mg  5 mg Oral Q4H PRN    ketorolac (TORADOL) injection 30 mg  30 mg IntraVENous Q6H    ondansetron (ZOFRAN) injection 4 mg  4 mg IntraVENous Q4H PRN    enoxaparin (LOVENOX) injection 40 mg  40 mg SubCUTAneous DAILY    Vancomycin Dosed by Pharmacy  1 Each Other Rx Dosing/Monitoring    vancomycin (VANCOCIN) 1,500 mg in 0.9% sodium chloride 500 mL IVPB  1,500 mg IntraVENous Q12H    [START ON 12/2/2020] DUE: Vancomycin Trough 12/2 at 1500   Other ONCE      Review of Systems   Constitutional: Negative for malaise/fatigue. Respiratory: Negative for cough, shortness of breath and wheezing. Cardiovascular: Negative for chest pain and palpitations. Gastrointestinal: Negative for abdominal pain, heartburn, nausea and vomiting. Neurological: Negative for headaches. Musculoskeletal: Denies any numbness/tingling of operative extremity    I/O (24Hr): Intake/Output Summary (Last 24 hours) at 12/1/2020 0849  Last data filed at 12/1/2020 9007  Gross per 24 hour   Intake 1560 ml   Output 1625 ml   Net -65 ml     Hemovac output per shift 260 mL. 675 mL over 24 hours. Objective  Labs/Imaging/Diagnostics    Labs:  CBC:  Recent Labs     12/01/20  0525   HGB 9.6*     CHEMISTRIES:  Recent Labs     12/01/20  0525      K 4.2      CO2 27   BUN 26*   CREA 1.39*   CA 7.7*   PT/INR:No results for input(s): INR, INREXT in the last 72 hours. No lab exists for component: PROTIME  APTT:No results for input(s): APTT in the last 72 hours. LIVER PROFILE:No results for input(s): AST, ALT in the last 72 hours. No lab exists for component: BILIDIR, BILITOT, ALKPHOS  No results found for: ALT, AST, GGT, GGTP, AP, APIT, APX, CBIL, TBIL, TBILI    Physical Exam:  Left lower extremity: Wound dressing clean dry and intact. Bibi dressing in place. Hemovac in place. Sensation intact throughout left lower extremity. No calf pain to palpation. DP pulses palpable. Cap refill is 2 seconds. EHL/DF/PF is 5 out of 5. Negative Homans. Left lower extremity neurovascularly intact.     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 Postop day #1. Continue with therapy as tolerated. We will leave Hemovac uncharged because of increased output. We will hold Lovenox for DVT prophylaxis at this time. Continue with mechanical DVT prophylaxis will start aspirin therapy for DVT prophylaxis once patient is discharged today. Infectious disease has been consulted. Antibiotics as per ID. Spirometer and exercises encouraged. Discontinue Toradol because of increasing creatinine. Acute blood loss anemia. Asymptomatic. Continue to monitor. Will add Feosol. If the patient's drainage output improves and his home health is set up for antibiotic therapy we will plan to discharge home today.       Electronically signed by Rui Kelley PA-C on 12/1/2020 at 8:49 AM

## 2020-12-01 NOTE — PROGRESS NOTES
Infectious Disease Progress Note               Subjective:   Pt seen and examined at bedside. Doing well, denies new complaints. Awaiting d/c later today     Objective:   Physical Exam:     Visit Vitals  /74 (BP Patient Position: Sitting)   Pulse 93   Temp 97.7 °F (36.5 °C)   Resp 18   Wt 214 lb 1.1 oz (97.1 kg)   SpO2 95%   BMI 30.65 kg/m²    O2 Flow Rate (L/min): 2 l/min O2 Device: Room air    Temp (24hrs), Av.7 °F (36.5 °C), Min:97.5 °F (36.4 °C), Max:98 °F (36.7 °C)    701 - 1900  In: -   Out: 140 [Drains:140]   1901 -  07  In: 0629 [P.O.:960; I.V.:600]  Out: 0024 [Urine:750; Drains:675]    General: NAD, alert  HEENT: JESSE, Moist mucosa   Lungs: CTA b/l   Heart: S1S2+, RRR, no murmur  Abdo: Soft, NT, ND, +BS   :   Exts: No edema, + pulses b/l   Skin: No wounds, No rashes or lesions      Data Review:       Recent Days:  Recent Labs     20  0525   HGB 9.6*     Recent Labs     20  0525   BUN 26*   CREA 1.39*       Lab Results   Component Value Date/Time    C-Reactive protein 3.37 (H) 2020 08:30 AM          Microbiology   - Intra-op Cx : Neg     Diagnostics   CXR Results  (Last 48 hours)    None          Assessment/Plan        1. H/o left prosthetic infection in 2020, s/p 6 wks of Vanc targetting Staph Epi       Underwent left knee re-implantation on       Doing well post-op, intra-op Cxs are pending       Continue on Vanc for 3 wks post-op then transition to long term doxycycline       Will follow renal function, CRP and ESR while on Vanc      2.  Left knee pain: Mgt per primary     Dispo: Cleared for d/c from ID stand point, will f/u as out pt      Sylvie Queen MD    2020

## 2020-12-01 NOTE — PROGRESS NOTES
Consult for Vancomycin Dosing by Pharmacy by Dr. Traci Espinosa provided for this 62y.o. year old male , for indication of Bone and Join Infection (H/o left prosthetic infection in 08/2020, s/p 6 wks of Vanc targetting Staph Epi; s/p left knee re-implantation today)    Day of Therapy: 1  Goal of Level(s): 15-20mcg/dL    Other Current Antibiotics: cefazolin (sx prophylaxis)    Significant Cultures:   Results       Procedure Component Value Units Date/Time    CULTURE, TISSUE Lorel Yanes STAIN [[id number]] Collected:  11/30/20 1230    Order Status:  Completed Specimen:  Bone Updated:  11/30/20 1952     Special Requests: No Special Requests        GRAM STAIN Rare WBCs seen         No organisms seen        Culture result: PENDING    CULTURE, TISSUE Lorel Yanes STAIN [[id number]] Collected:  11/30/20 1230    Order Status:  Completed Specimen:  Tissue Updated:  11/30/20 2025     Special Requests: No Special Requests        GRAM STAIN Few WBCs seen         No organisms seen        Culture result: PENDING    CULTURE, TISSUE Lorel Yanes STAIN [[id number]] Collected:  11/30/20 1230    Order Status:  Completed Specimen:  Tissue Updated:  11/30/20 1950     Special Requests: No Special Requests        GRAM STAIN Rare WBCs seen        Serum Creatinine Creatinine   Date Value Ref Range Status   11/16/2020 1.11 0.70 - 1.30 mg/dL Final   09/21/2020 1.18 0.70 - 1.30 mg/dL Final   09/17/2020 1.08 0.70 - 1.30 mg/dL Final      Creatinine Clearance Estimated Creatinine Clearance: 87 mL/min (by C-G formula based on SCr of 1.11 mg/dL).    BUN Lab Results   Component Value Date/Time    BUN 15 11/16/2020 08:55 AM      WBC Lab Results   Component Value Date/Time    WBC 5.7 11/16/2020 08:55 AM      Temp 97.9 °F (36.6 °C)     Last Level:   Vancomycin,trough   Date Value Ref Range Status   09/21/2020 15.1 (H) 5.0 - 10.0 ug/mL Final    No results found for: VANCR    Ht Readings from Last 1 Encounters:   11/16/20 178 cm (70.08\")        Wt Readings from Last 1 Encounters:   11/30/20 97.1 kg (214 lb 1.1 oz)     Ideal body weight: 73.2 kg (161 lb 5.4 oz)  Adjusted ideal body weight: 82.8 kg (182 lb 6.9 oz)     New Regimen:   Start Vancomycin 1500 mg Q12H. Will order a trough to be drawn before the 4th dose (12/2 at 1500). Pharmacy to follow daily and will make changes to dose and/or frequency based on clinical status.     _________________________________     Pharmacist Jose Alfredo Miller, SLYD

## 2020-12-02 ENCOUNTER — TELEPHONE (OUTPATIENT)
Dept: INFECTIOUS DISEASES | Age: 55
End: 2020-12-02

## 2020-12-02 NOTE — TELEPHONE ENCOUNTER
Patient called and stated that Elisabeth did not get the message to have his medication delivered and its been 2 days now he hasn't had his medication--can you please give him a call [phone].

## 2020-12-04 LAB
BACTERIA SPEC CULT: NORMAL
GRAM STN SPEC: NORMAL
SPECIAL REQUESTS,SREQ: NORMAL

## 2020-12-04 NOTE — DISCHARGE SUMMARY
Date: 12/4/2020  Patient Name: Jordyn Conner YOB: 1965 Age: 60 y.o. 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 Surgeries/procedures Performed:   Revision left total knee arthroplasty with removal of articular thing cement spacer and intraoperative frozen sections on November 30, 2020. Narrative of Hospital Course: The patient showed no immediate postoperative complications. He showed satisfactory progress with physical therapy and rehabilitation for mobilization and gait training. A peek line was placed the day after surgery, for long-term IV antibiotics for the next 2-3 weeks. Consultants:  IP CONSULT TO INFECTIOUS DISEASESIP CONSULT TO INFECTIOUS DISEASES    Discharge Plan:  Home   With home care, for IV antibiotics and home physical therapy    Diet:  regular    Activity:  For number of days (if applicable): Other Instructions:    Provider Follow-Up:   Orders Placed This Encounter FOLLOW UP VISIT Appointment in: One Week DR. Faiza Medrano    Order Comments:            DR. Faiza Medrano        Standing Status: Standing        Number of Occurrences: 1        Order Specific Question: Appointment in        Answer: One Week     Significant Diagnostic Studies:      Discharge Medications    Discharge Medication List as of 12/1/2020  2:27 PMSTART taking these medicationsacetaminophen (TYLENOL) 500 mg tabletTake 2 Tabs by mouth every eight (8) hours as needed for Pain. Indications: pain, No Print, Disp-30 Tab,R-0ferrous sulfate 325 mg (65 mg iron) tabletTake 1 Tab by mouth two (2) times daily (with meals) for 7 days. , Normal, Disp-14 Tab,R-0oxyCODONE IR (ROXICODONE) 5 mg immediate release tabletTake 1 Tab by mouth every four (4) hours as needed for Pain for up to 4 days. Max Daily Amount: 30 mg. PATIENT MAY TAKE 1 TO 2 TABS AS NEEDED FOR PAIN  Indications: pain, Normal, Disp-20 Tab,R-0!! vancomycin 10 gram 1,500 mg IVPB1,500 mg by IntraVENous route every twelve (12) hours every twelve (12) hours for 14 days. , No Print, Disp-14 Dose,R-0DOSING AS PER INFECTIOUS DISEASE!! vancomycin 10 gram 1,000 mg IVPB1,000 mg by IntraVENous route every twelve (12) hours for 21 days. , Print, Disp-42 Dose,R-0Continue for 3-4 wks from 11/20/20! ! - Potential duplicate medications found. Please discuss with provider. CONTINUE these medications which have NOT CHANGEDrizatriptan (Maxalt-MLT) 10 mg disintegrating tabletTake 10 mg by mouth once as needed., Historical MedSTOP taking these medicationsnaproxen (NAPROSYN) 500 mg tabletComments:Reason for Stopping:    Electronically signed by Sandra Saenz MD on 12/4/20 at 4:51 PM

## 2020-12-16 NOTE — PROGRESS NOTES
Made discharge callback this evening to the patient 12/16/2020 at 1800. Patient states that everything has been great and has already had his followup with the doctor and everything was good. No further questions at this time.

## 2020-12-22 ENCOUNTER — OFFICE VISIT (OUTPATIENT)
Dept: INFECTIOUS DISEASES | Age: 55
End: 2020-12-22
Payer: OTHER GOVERNMENT

## 2020-12-22 VITALS
WEIGHT: 215 LBS | SYSTOLIC BLOOD PRESSURE: 132 MMHG | BODY MASS INDEX: 30.78 KG/M2 | RESPIRATION RATE: 16 BRPM | HEART RATE: 94 BPM | HEIGHT: 70 IN | DIASTOLIC BLOOD PRESSURE: 80 MMHG | OXYGEN SATURATION: 98 %

## 2020-12-22 DIAGNOSIS — Z96.659 INFECTION OF PROSTHETIC KNEE JOINT, SUBSEQUENT ENCOUNTER: Primary | ICD-10-CM

## 2020-12-22 DIAGNOSIS — T84.59XD INFECTION OF PROSTHETIC KNEE JOINT, SUBSEQUENT ENCOUNTER: Primary | ICD-10-CM

## 2020-12-22 PROCEDURE — 99214 OFFICE O/P EST MOD 30 MIN: CPT | Performed by: INTERNAL MEDICINE

## 2020-12-22 RX ORDER — RIFAMPIN 300 MG/1
300 CAPSULE ORAL 2 TIMES DAILY
Qty: 42 CAP | Refills: 0 | Status: SHIPPED | OUTPATIENT
Start: 2020-12-22 | End: 2021-01-12

## 2020-12-22 RX ORDER — RIZATRIPTAN BENZOATE 5 MG/1
5 TABLET ORAL
COMMUNITY
Start: 2020-02-25 | End: 2021-02-24

## 2020-12-22 RX ORDER — TOPIRAMATE 100 MG/1
TABLET, FILM COATED ORAL
COMMUNITY

## 2020-12-22 RX ORDER — SULFAMETHOXAZOLE AND TRIMETHOPRIM 800; 160 MG/1; MG/1
1 TABLET ORAL 2 TIMES DAILY
Qty: 42 TAB | Refills: 0 | Status: SHIPPED | OUTPATIENT
Start: 2020-12-22 | End: 2021-01-12

## 2020-12-22 NOTE — PROGRESS NOTES
Subjective  Klaus Lorenzoh     HPI    60 y.o WM presenting for a post d/c f/u. His med history is sig for  left prosthetic knee infection for which he underwent prosthesis explantation and placement of antibiotic spacer on 8/10 by Dr. Srini Garcia. Intraoperative Cxs were negative however staph epidermidis was isolated from left knee aspirate Cx done as outpt. He completed a 6 wk course of Vancomycin post w normalized inflammatory markers. Pt underwent left knee re-implantation on  and was placed on a 3 wk course of empiric Vanc though there was no reports of infection intra-operatively and Cxs were neg. Most recent labs  showed a CRP of 8.76, down from 75 (),  ESR was not done, was 12 on . He will like PICC line discontinued today. He reports left knee swelling but denies pain. ROS  Review of Systems   Constitutional: Negative. Respiratory: Negative. Cardiovascular: Negative. Gastrointestinal: Negative. Genitourinary: Negative. Musculoskeletal: Negative. Skin: Negative. Neurological: Negative.       Past Medical History:   Diagnosis Date    Arthritis     Cancer ([facility] [address]     skin cancers - check every 6 months     Migraine headache     Staphylococcal infection     methicillin resistant staphlococcus epidermidis pt left knee Aug 2020        Past Surgical History:   Procedure Laterality Date    HX ACL RECONSTRUCTION Left 08/10/2020    HX ACL RECONSTRUCTION Right     HX KNEE ARTHROSCOPY Left     2020    HX KNEE REPLACEMENT Bilateral      and  left knee revision Oct 2019    HX OTHER SURGICAL      total knee explant Aug 2020    HX OTHER SURGICAL      various skin cancers removed from upper body        Social History     Tobacco Use    Smoking status: Former Smoker     Quit date:      Years since quittin.9    Smokeless tobacco: Never Used   Substance Use Topics    Alcohol use: Not Currently     Binge frequency: Never    Drug use: Never        Family History   Problem Relation Age of Onset    Lung Disease Mother     Heart Disease Father     Pneumonia Father         No Known Allergies     Objective  Physical Exam  Constitutional:       Appearance: Normal appearance. Cardiovascular:      Rate and Rhythm: Normal rate and regular rhythm. Pulmonary:      Effort: Pulmonary effort is normal.      Breath sounds: Normal breath sounds. Abdominal:      General: Abdomen is flat. Palpations: Abdomen is soft. Musculoskeletal: Normal range of motion. Skin:     General: Skin is warm. Neurological:      General: No focal deficit present. Mental Status: He is alert. Assessment & Plan  Diagnoses and all orders for this visit:    1. Infection of prosthetic knee joint, subsequent encounter      Healing left prosthetic knee. Some swelling but no warmth or tenderness       Inflammatory markers normalized on most recent labs as above       S/p 3 wks of Vanc post-op. PICC line discontinued today (12/22)       Started on a 3 wk course of Bactrim and Rifampin      Will repeat CRP, renal panel and ESR next wk and a month later   -     trimethoprim-sulfamethoxazole (Bactrim DS) 160-800 mg per tablet; Take 1 Tab by mouth two (2) times a day for 21 days. -     rifAMPin (RIFADIN) 300 mg capsule;  Take 1 Cap by mouth two (2) times a day for 21 days.  -     RENAL FUNCTION PANEL  -     CRP, HIGH SENSITIVITY  -     SED RATE (ESR)

## 2022-03-19 PROBLEM — Z89.529 ACQUIRED ABSENCE OF KNEE JOINT FOLLOWING EXPLANTATION OF JOINT PROSTHESIS WITH PRESENCE OF ANTIBIOTIC-IMPREGNATED CEMENT SPACER: Status: ACTIVE | Noted: 2020-11-30

## 2022-03-19 PROBLEM — T84.54XD INFECTION AND INFLAMMATORY REACTION DUE TO INTERNAL LEFT KNEE PROSTHESIS, SUBSEQUENT ENCOUNTER: Status: ACTIVE | Noted: 2020-11-30

## 2023-01-01 NOTE — ANESTHESIA PREPROCEDURE EVALUATION
Relevant Problems   No relevant active problems       Anesthetic History   No history of anesthetic complications            Review of Systems / Medical History  Patient summary reviewed, nursing notes reviewed and pertinent labs reviewed    Pulmonary  Within defined limits                 Neuro/Psych         Headaches     Cardiovascular  Within defined limits                Exercise tolerance: >4 METS     GI/Hepatic/Renal  Within defined limits              Endo/Other        Arthritis     Other Findings            Past Medical History:   Diagnosis Date    Arthritis     Cancer ([facility] [address]     skin cancers - check every 6 months     Migraine headache     Staphylococcal infection     methicillin resistant staphlococcus epidermidis pt left knee Aug 2020       Past Surgical History:   Procedure Laterality Date    HX ACL RECONSTRUCTION Left 08/10/2020    HX ACL RECONSTRUCTION Right     HX KNEE ARTHROSCOPY Left     Jan 2020    HX KNEE REPLACEMENT Bilateral     2017 and  left knee revision Oct 2019    HX OTHER SURGICAL      total knee explant Aug 2020    HX OTHER SURGICAL      various skin cancers removed from upper body       Current Outpatient Medications   Medication Instructions    naproxen (NAPROSYN) 500 mg tablet Take 500 mg by mouth.  As needed    rizatriptan (MAXALT-MLT) 10 mg, Oral, ONCE PRN       Current Facility-Administered Medications   Medication Dose Route Frequency    lactated Ringers infusion  20 mL/hr IntraVENous CONTINUOUS    tranexamic acid (CYKLOKAPRON) 1,000 mg in 0.9% sodium chloride 50 mL IVPB  1 g IntraVENous ONCE    tranexamic acid (CYKLOKAPRON) 1,000 mg in 0.9% sodium chloride 50 mL IVPB  1 g IntraVENous ONCE    ceFAZolin (ANCEF) 2g in 100 mL dextrose (iso-osmotic) IVPB  2 g IntraVENous ONCE       Patient Vitals for the past 24 hrs:   Temp Pulse Resp BP SpO2   11/30/20 0640 36.7 °C (98 °F) 73 16 137/82 97 %       Lab Results   Component Value Date/Time    WBC 5.7 11/16/2020 08:55 AM HGB 13.0 11/16/2020 08:55 AM    HCT 42.4 11/16/2020 08:55 AM    PLATELET 246 49/41/6080 08:55 AM    MCV 81.9 11/16/2020 08:55 AM     Lab Results   Component Value Date/Time    Sodium 142 11/16/2020 08:55 AM    Potassium 3.9 11/16/2020 08:55 AM    Chloride 107 11/16/2020 08:55 AM    CO2 29 11/16/2020 08:55 AM    Anion gap 6 11/16/2020 08:55 AM    Glucose 102 (H) 11/16/2020 08:55 AM    BUN 15 11/16/2020 08:55 AM    Creatinine 1.11 11/16/2020 08:55 AM    BUN/Creatinine ratio 14 11/16/2020 08:55 AM    GFR est AA >60 11/16/2020 08:55 AM    GFR est non-AA >60 11/16/2020 08:55 AM    Calcium 9.0 11/16/2020 08:55 AM     No results found for: APTT, PTP, INR, INREXT  Lab Results   Component Value Date/Time    Glucose 102 (H) 11/16/2020 08:55 AM     Physical Exam    Airway  Mallampati: II  TM Distance: 4 - 6 cm  Neck ROM: normal range of motion   Mouth opening: Normal     Cardiovascular    Rhythm: regular  Rate: normal         Dental  No notable dental hx       Pulmonary  Breath sounds clear to auscultation               Abdominal  GI exam deferred       Other Findings            Anesthetic Plan    ASA: 1  Anesthesia type: spinal      Post-op pain plan if not by surgeon: regional and peripheral nerve block single    Induction: Intravenous  Anesthetic plan and risks discussed with: Patient and Family      Benefits and risks of spinal anesthesia discussed with patient/family. All questions answered. (2) cough or sneeze

## 2023-01-17 ENCOUNTER — TRANSCRIBE ORDER (OUTPATIENT)
Dept: SCHEDULING | Age: 58
End: 2023-01-17

## 2023-01-17 DIAGNOSIS — T84.50XS INFECTION OF PROSTHETIC JOINT, SEQUELA: Primary | ICD-10-CM

## 2023-04-22 DIAGNOSIS — T84.50XS INFECTION OF PROSTHETIC JOINT, SEQUELA: Primary | ICD-10-CM

## (undated) DEVICE — SUTURE VCRL SZ 2-0 L27IN ABSRB UD L26MM CT-2 1/2 CIR J269H

## (undated) DEVICE — Device

## (undated) DEVICE — KIT EVAC 400CC DIA3/16IN H PAT 12.5IN 3 SPR RND SHP PVC DRN

## (undated) DEVICE — PREP SKN CHLRAPRP APL 26ML STR --

## (undated) DEVICE — DRAPE, HEAVY DUTY, STERILE. FOR A 6' BIG CASE BACK TABLE MODEL 429: Brand: OR SPECIFIC

## (undated) DEVICE — ZIMMER® STERILE DISPOSABLE TOURNIQUET CUFF WITH PROTECTIVE SLEEVE AND PLC, DUAL PORT, SINGLE BLADDER, 34 IN. (86 CM)

## (undated) DEVICE — HANDPIECE SET WITH HIGH FLOW TIP AND SUCTION TUBE: Brand: INTERPULSE

## (undated) DEVICE — STERILE POLYISOPRENE POWDER-FREE SURGICAL GLOVES WITH EMOLLIENT COATING: Brand: PROTEXIS

## (undated) DEVICE — BASIC SINGLE BASIN-LF: Brand: MEDLINE INDUSTRIES, INC.

## (undated) DEVICE — SUTURE VCRL SZ 0 L36IN ABSRB UD L36MM CT-1 1/2 CIR J946H

## (undated) DEVICE — SUTURE ABSORBABLE BRAIDED 0 CT 36 IN DA UD VICRYL VCP958H

## (undated) DEVICE — 3M™ STERI-DRAPE™ INCISE DRAPE 1050 (60CM X 45CM): Brand: STERI-DRAPE™

## (undated) DEVICE — 3M™ STERI-DRAPE™ U-DRAPE 1067 1067 5/BX 4BX/CS/CTN&#X20;: Brand: STERI-DRAPE™

## (undated) DEVICE — INTENDED TO BE USED TO OCCLUDE, RETRACT AND IDENTIFY ARTERIES, VEINS, TENDONS AND NERVES IN SURGICAL PROCEDURES: Brand: STERION®  VESSEL LOOP

## (undated) DEVICE — SUTURE ABSORBABLE MONOFILAMENT 1-0 CT1 36 IN VIO PDS + PDP347H

## (undated) DEVICE — MAJOR ORTHO PROCEDURE PACK: Brand: MEDLINE INDUSTRIES, INC.

## (undated) DEVICE — TURNOVER KIT OR CUST CMB FLD GEN LF

## (undated) DEVICE — 3M™ IOBAN™ 2 ANTIMICROBIAL INCISE DRAPE 6651EZ: Brand: IOBAN™ 2

## (undated) DEVICE — INTENDED FOR TISSUE SEPARATION, AND OTHER PROCEDURES THAT REQUIRE A SHARP SURGICAL BLADE TO PUNCTURE OR CUT.: Brand: BARD-PARKER SAFETY BLADES SIZE 10, STERILE

## (undated) DEVICE — CONTAINER,SPECIMEN,PNEU TUBE,4OZ,OR STRL: Brand: MEDLINE

## (undated) DEVICE — SUT ETHLN 3-0 18IN FS1 BLK --

## (undated) DEVICE — SOL IRR NACL 0.9% 500ML POUR --

## (undated) DEVICE — CEMENT MIXING SYSTEM WITH FEMORAL BREAKWAY NOZZLE: Brand: REVOLUTION

## (undated) DEVICE — DRAPE,TOP,102X53,STERILE: Brand: MEDLINE

## (undated) DEVICE — SUTURE ETHLN SZ 2-0 L18IN NONABSORBABLE BLK L26MM FS 3/8 664G

## (undated) DEVICE — PADDING CAST W6INXL4YD ST COT COHESIVE HND TEARABLE SPEC

## (undated) DEVICE — PICO 7 10CM X 40CM: Brand: PICO™ 7

## (undated) DEVICE — SUTURE ABSORBABLE BRAIDED 2-0 CT 36 IN DA UD VICRYL VCP957H

## (undated) DEVICE — GARMENT COMPR REG WOM SZ 9 M SZ 7 FT NONSTERILE FLOTRN

## (undated) DEVICE — TURNOVER KIT A GEN SURG

## (undated) DEVICE — STERILE POLYISOPRENE POWDER-FREE SURGICAL GLOVES: Brand: PROTEXIS